# Patient Record
Sex: FEMALE | Race: WHITE | Employment: OTHER | URBAN - METROPOLITAN AREA
[De-identification: names, ages, dates, MRNs, and addresses within clinical notes are randomized per-mention and may not be internally consistent; named-entity substitution may affect disease eponyms.]

---

## 2017-01-06 ENCOUNTER — HOSPITAL ENCOUNTER (OUTPATIENT)
Dept: PHYSICAL THERAPY | Facility: CLINIC | Age: 77
Setting detail: THERAPIES SERIES
End: 2017-01-06
Attending: ORTHOPAEDIC SURGERY
Payer: MEDICARE

## 2017-01-06 PROCEDURE — 40000718 ZZHC STATISTIC PT DEPARTMENT ORTHO VISIT: Performed by: PHYSICAL THERAPIST

## 2017-01-06 PROCEDURE — 97110 THERAPEUTIC EXERCISES: CPT | Mod: GP | Performed by: PHYSICAL THERAPIST

## 2017-01-20 ENCOUNTER — ANESTHESIA EVENT (OUTPATIENT)
Dept: GASTROENTEROLOGY | Facility: CLINIC | Age: 77
End: 2017-01-20
Payer: MEDICARE

## 2017-01-20 NOTE — ANESTHESIA PREPROCEDURE EVALUATION
Anesthesia Evaluation     . Pt has had prior anesthetic. Type: General and MAC    No history of anesthetic complications     ROS/MED HX    ENT/Pulmonary:  - neg pulmonary ROS     Neurologic: Comment: Imbalance  CTS      Cardiovascular: Comment: Dependent edema    (+) Dyslipidemia, hypertension----. : . . . :. . Previous cardiac testing Echodate:[id number]:NLdate: results:ECG reviewed date:11-24-16 results:Sinus Rhythm   -Left atrial enlargement.     BORDERLINE date: results:          METS/Exercise Tolerance:  3 - Able to walk 1-2 blocks without stopping   Hematologic:     (+) Anemia, -      Musculoskeletal: Comment: Osteopenia  Osteoarthrosis  RC tear  Avascular necrosis        GI/Hepatic: Comment: Colon polyp       (-) liver disease   Renal/Genitourinary: Comment: Nocturia  Proteinuria  CKD stage 3    (+) chronic renal disease, type: CRI,       Endo: Comment: goiter    (+) type I DM, thyroid problem hypothyroidism, Obesity, .      Psychiatric:  - neg psychiatric ROS       Infectious Disease:  - neg infectious disease ROS       Malignancy:      - no malignancy   Other: Comment: Physical deconditioning  Heredity hemorrhagic telangiectasia              Physical Exam  Normal systems: cardiovascular, pulmonary and dental    Airway   Mallampati: II    Dental     Cardiovascular       Pulmonary                     Anesthesia Plan      History & Physical Review  History and physical reviewed and following examination; no interval change.    ASA Status:  3 .        Plan for MAC Reason for MAC:  Deep or markedly invasive procedure (G8)         Postoperative Care      Consents  Anesthetic plan, risks, benefits and alternatives discussed with:  Patient..                          .

## 2017-01-23 ENCOUNTER — SURGERY (OUTPATIENT)
Age: 77
End: 2017-01-23

## 2017-01-23 ENCOUNTER — ANESTHESIA (OUTPATIENT)
Dept: GASTROENTEROLOGY | Facility: CLINIC | Age: 77
End: 2017-01-23
Payer: MEDICARE

## 2017-01-23 PROCEDURE — 25000125 ZZHC RX 250: Performed by: NURSE ANESTHETIST, CERTIFIED REGISTERED

## 2017-01-23 RX ORDER — PROPOFOL 10 MG/ML
INJECTION, EMULSION INTRAVENOUS PRN
Status: DISCONTINUED | OUTPATIENT
Start: 2017-01-23 | End: 2017-01-23

## 2017-01-23 RX ORDER — PROPOFOL 10 MG/ML
INJECTION, EMULSION INTRAVENOUS CONTINUOUS PRN
Status: DISCONTINUED | OUTPATIENT
Start: 2017-01-23 | End: 2017-01-23

## 2017-01-23 RX ORDER — GLYCOPYRROLATE 0.2 MG/ML
INJECTION, SOLUTION INTRAMUSCULAR; INTRAVENOUS PRN
Status: DISCONTINUED | OUTPATIENT
Start: 2017-01-23 | End: 2017-01-23

## 2017-01-23 RX ADMIN — GLYCOPYRROLATE 0.2 MG: 0.2 INJECTION, SOLUTION INTRAMUSCULAR; INTRAVENOUS at 11:10

## 2017-01-23 RX ADMIN — PROPOFOL 20 MG: 10 INJECTION, EMULSION INTRAVENOUS at 11:09

## 2017-01-23 RX ADMIN — PROPOFOL 20 MG: 10 INJECTION, EMULSION INTRAVENOUS at 11:12

## 2017-01-23 RX ADMIN — PROPOFOL 20 MG: 10 INJECTION, EMULSION INTRAVENOUS at 11:10

## 2017-01-23 RX ADMIN — PROPOFOL 180 MCG/KG/MIN: 10 INJECTION, EMULSION INTRAVENOUS at 11:12

## 2017-01-23 RX ADMIN — PROPOFOL 20 MG: 10 INJECTION, EMULSION INTRAVENOUS at 11:00

## 2017-01-23 RX ADMIN — PROPOFOL 20 MG: 10 INJECTION, EMULSION INTRAVENOUS at 11:11

## 2017-01-23 NOTE — ANESTHESIA CARE TRANSFER NOTE
Patient: Ana Easley    COLONOSCOPY (N/A Rectum)  Additional InformationProcedure(s):  Colonoscopy - Wound Class: II-Clean Contaminated    Diagnosis: screening  Diagnosis Additional Information: No value filed.    Anesthesia Type:   MAC     Note:    Patient transferred to:Phase II        Vitals: (Last set prior to Anesthesia Care Transfer)              Electronically Signed By: Eric Temple CRNA, APRN CRNA  January 23, 2017  11:41 AM

## 2017-02-07 NOTE — ADDENDUM NOTE
Encounter addended by: Hoenk, Kris, PT on: 2/7/2017  4:02 PM<BR>     Documentation filed: Clinical Notes, Episodes, Inpatient Document Flowsheet

## 2017-02-07 NOTE — PROGRESS NOTES
PHYSICAL THERAPY DISCHARGE  02/7/17 1100   Signing Clinician's Name / Credentials   Signing clinician's name / credentials Kris Hoenk, PT   Session Number   Session Number 2MC, pt phoned 1/12/17 stating she did not need any further PT   Ortho Goal 1   Goal Description pt will be able to sleep on left side in 4wkj   Target Date 01/29/17   Ortho Goal 2   Goal Description pt will be able to do all regular household tasks for her self as normal for her in 4wk   Target Date 01/29/17   Subjective Report   Subjective Report little better, said her  says she is moving better   Objective Measures   Objective Measures Objective Measure 1   Objective Measure 1   Details AROM L close to =R, R quite limited also   Therapeutic Procedure/exercise   Minutes 17   Skilled Intervention ROM and strength ex progression to further improve function   Patient Response sx listed   Treatment Detail pulleys, AA flex seated x5 - painful, active IR/ER with towel roll under arm seated x10, scap sets 5sec x5, review stretches   Plan   Plan for next session see 1x more, probably DC  Medicare G-code:  Patient did not attend a final scheduled session prior to discharge.  Unable to determine discharge functional status.    Pt DC to home program at this time   Comments   Comments shorter session as ROM doing well   Total Session Time   Total Session Time 17   Kris Hoenk, PT #[id number]  [facility]  '

## 2019-10-03 ENCOUNTER — HOSPITAL ENCOUNTER (INPATIENT)
Facility: CLINIC | Age: 79
LOS: 5 days | Discharge: NURSING, REHAB OR LONG-TERM CARE | DRG: 871 | End: 2019-10-08
Attending: FAMILY MEDICINE | Admitting: INTERNAL MEDICINE
Payer: MEDICARE

## 2019-10-03 ENCOUNTER — APPOINTMENT (OUTPATIENT)
Dept: GENERAL RADIOLOGY | Facility: CLINIC | Age: 79
DRG: 871 | End: 2019-10-03
Attending: FAMILY MEDICINE
Payer: MEDICARE

## 2019-10-03 ENCOUNTER — APPOINTMENT (OUTPATIENT)
Dept: CT IMAGING | Facility: CLINIC | Age: 79
DRG: 871 | End: 2019-10-03
Attending: FAMILY MEDICINE
Payer: MEDICARE

## 2019-10-03 DIAGNOSIS — N39.0 URINARY TRACT INFECTION WITH HEMATURIA, SITE UNSPECIFIED: ICD-10-CM

## 2019-10-03 DIAGNOSIS — G93.41 SEPTIC ENCEPHALOPATHY: ICD-10-CM

## 2019-10-03 DIAGNOSIS — R31.9 URINARY TRACT INFECTION WITH HEMATURIA, SITE UNSPECIFIED: ICD-10-CM

## 2019-10-03 DIAGNOSIS — R31.9 HEMATURIA, UNSPECIFIED TYPE: ICD-10-CM

## 2019-10-03 DIAGNOSIS — I10 ESSENTIAL HYPERTENSION: Primary | Chronic | ICD-10-CM

## 2019-10-03 DIAGNOSIS — N30.00 ACUTE CYSTITIS WITHOUT HEMATURIA: ICD-10-CM

## 2019-10-03 DIAGNOSIS — N39.0 URINARY TRACT INFECTION WITHOUT HEMATURIA, SITE UNSPECIFIED: ICD-10-CM

## 2019-10-03 LAB
ALBUMIN SERPL-MCNC: 3.4 G/DL (ref 3.4–5)
ALBUMIN UR-MCNC: 100 MG/DL
ALP SERPL-CCNC: 104 U/L (ref 40–150)
ALT SERPL W P-5'-P-CCNC: 28 U/L (ref 0–50)
ANION GAP SERPL CALCULATED.3IONS-SCNC: 8 MMOL/L (ref 3–14)
APPEARANCE UR: ABNORMAL
AST SERPL W P-5'-P-CCNC: 39 U/L (ref 0–45)
BACTERIA #/AREA URNS HPF: ABNORMAL /HPF
BASOPHILS # BLD AUTO: 0 10E9/L (ref 0–0.2)
BASOPHILS NFR BLD AUTO: 0.3 %
BILIRUB SERPL-MCNC: 0.9 MG/DL (ref 0.2–1.3)
BILIRUB UR QL STRIP: NEGATIVE
BUN SERPL-MCNC: 19 MG/DL (ref 7–30)
CALCIUM SERPL-MCNC: 8.8 MG/DL (ref 8.5–10.1)
CHLORIDE SERPL-SCNC: 97 MMOL/L (ref 94–109)
CO2 SERPL-SCNC: 27 MMOL/L (ref 20–32)
COLOR UR AUTO: ABNORMAL
CREAT SERPL-MCNC: 0.69 MG/DL (ref 0.52–1.04)
DIFFERENTIAL METHOD BLD: NORMAL
EOSINOPHIL # BLD AUTO: 0 10E9/L (ref 0–0.7)
EOSINOPHIL NFR BLD AUTO: 0 %
ERYTHROCYTE [DISTWIDTH] IN BLOOD BY AUTOMATED COUNT: 13 % (ref 10–15)
GFR SERPL CREATININE-BSD FRML MDRD: 82 ML/MIN/{1.73_M2}
GLUCOSE BLDC GLUCOMTR-MCNC: 210 MG/DL (ref 70–99)
GLUCOSE BLDC GLUCOMTR-MCNC: 376 MG/DL (ref 70–99)
GLUCOSE BLDC GLUCOMTR-MCNC: 387 MG/DL (ref 70–99)
GLUCOSE SERPL-MCNC: 207 MG/DL (ref 70–99)
GLUCOSE UR STRIP-MCNC: >499 MG/DL
HBA1C MFR BLD: 7.7 % (ref 0–5.6)
HCT VFR BLD AUTO: 44.7 % (ref 35–47)
HGB BLD-MCNC: 15.1 G/DL (ref 11.7–15.7)
HGB UR QL STRIP: ABNORMAL
IMM GRANULOCYTES # BLD: 0 10E9/L (ref 0–0.4)
IMM GRANULOCYTES NFR BLD: 0.4 %
KETONES UR STRIP-MCNC: 20 MG/DL
LACTATE BLD-SCNC: 1.1 MMOL/L (ref 0.7–2)
LEUKOCYTE ESTERASE UR QL STRIP: ABNORMAL
LYMPHOCYTES # BLD AUTO: 0.9 10E9/L (ref 0.8–5.3)
LYMPHOCYTES NFR BLD AUTO: 9.3 %
MCH RBC QN AUTO: 31.9 PG (ref 26.5–33)
MCHC RBC AUTO-ENTMCNC: 33.8 G/DL (ref 31.5–36.5)
MCV RBC AUTO: 94 FL (ref 78–100)
MONOCYTES # BLD AUTO: 1 10E9/L (ref 0–1.3)
MONOCYTES NFR BLD AUTO: 10.6 %
NEUTROPHILS # BLD AUTO: 7.3 10E9/L (ref 1.6–8.3)
NEUTROPHILS NFR BLD AUTO: 79.4 %
NITRATE UR QL: NEGATIVE
NRBC # BLD AUTO: 0 10*3/UL
NRBC BLD AUTO-RTO: 0 /100
PH UR STRIP: 5 PH (ref 5–7)
PLATELET # BLD AUTO: 197 10E9/L (ref 150–450)
POTASSIUM SERPL-SCNC: 3.9 MMOL/L (ref 3.4–5.3)
PROT SERPL-MCNC: 7.3 G/DL (ref 6.8–8.8)
RBC # BLD AUTO: 4.74 10E12/L (ref 3.8–5.2)
RBC #/AREA URNS AUTO: 6 /HPF (ref 0–2)
SODIUM SERPL-SCNC: 132 MMOL/L (ref 133–144)
SOURCE: ABNORMAL
SP GR UR STRIP: 1.02 (ref 1–1.03)
UROBILINOGEN UR STRIP-MCNC: 4 MG/DL (ref 0–2)
WBC # BLD AUTO: 9.2 10E9/L (ref 4–11)
WBC #/AREA URNS AUTO: >182 /HPF (ref 0–5)
WBC CLUMPS #/AREA URNS HPF: PRESENT /HPF

## 2019-10-03 PROCEDURE — 99285 EMERGENCY DEPT VISIT HI MDM: CPT | Mod: 25 | Performed by: FAMILY MEDICINE

## 2019-10-03 PROCEDURE — 99223 1ST HOSP IP/OBS HIGH 75: CPT | Mod: AI | Performed by: PHYSICIAN ASSISTANT

## 2019-10-03 PROCEDURE — 25000128 H RX IP 250 OP 636: Performed by: PHYSICIAN ASSISTANT

## 2019-10-03 PROCEDURE — 96365 THER/PROPH/DIAG IV INF INIT: CPT | Performed by: FAMILY MEDICINE

## 2019-10-03 PROCEDURE — 87088 URINE BACTERIA CULTURE: CPT | Performed by: FAMILY MEDICINE

## 2019-10-03 PROCEDURE — 81001 URINALYSIS AUTO W/SCOPE: CPT | Performed by: FAMILY MEDICINE

## 2019-10-03 PROCEDURE — 83605 ASSAY OF LACTIC ACID: CPT | Performed by: FAMILY MEDICINE

## 2019-10-03 PROCEDURE — 85025 COMPLETE CBC W/AUTO DIFF WBC: CPT | Performed by: FAMILY MEDICINE

## 2019-10-03 PROCEDURE — 12000000 ZZH R&B MED SURG/OB

## 2019-10-03 PROCEDURE — 70450 CT HEAD/BRAIN W/O DYE: CPT

## 2019-10-03 PROCEDURE — 87086 URINE CULTURE/COLONY COUNT: CPT | Performed by: FAMILY MEDICINE

## 2019-10-03 PROCEDURE — 00000146 ZZHCL STATISTIC GLUCOSE BY METER IP

## 2019-10-03 PROCEDURE — 99207 ZZC CDG-CODE CATEGORY CHANGED: CPT | Performed by: PHYSICIAN ASSISTANT

## 2019-10-03 PROCEDURE — 83036 HEMOGLOBIN GLYCOSYLATED A1C: CPT | Performed by: PHYSICIAN ASSISTANT

## 2019-10-03 PROCEDURE — 96366 THER/PROPH/DIAG IV INF ADDON: CPT | Performed by: FAMILY MEDICINE

## 2019-10-03 PROCEDURE — 25800030 ZZH RX IP 258 OP 636: Performed by: PHYSICIAN ASSISTANT

## 2019-10-03 PROCEDURE — 80053 COMPREHEN METABOLIC PANEL: CPT | Performed by: FAMILY MEDICINE

## 2019-10-03 PROCEDURE — 87040 BLOOD CULTURE FOR BACTERIA: CPT | Performed by: FAMILY MEDICINE

## 2019-10-03 PROCEDURE — 71046 X-RAY EXAM CHEST 2 VIEWS: CPT

## 2019-10-03 PROCEDURE — 25000132 ZZH RX MED GY IP 250 OP 250 PS 637: Mod: GY | Performed by: PHYSICIAN ASSISTANT

## 2019-10-03 PROCEDURE — 99285 EMERGENCY DEPT VISIT HI MDM: CPT | Mod: Z6 | Performed by: FAMILY MEDICINE

## 2019-10-03 PROCEDURE — 25000128 H RX IP 250 OP 636: Performed by: FAMILY MEDICINE

## 2019-10-03 PROCEDURE — 87186 SC STD MICRODIL/AGAR DIL: CPT | Performed by: FAMILY MEDICINE

## 2019-10-03 PROCEDURE — 25000132 ZZH RX MED GY IP 250 OP 250 PS 637: Mod: GY | Performed by: FAMILY MEDICINE

## 2019-10-03 RX ORDER — LABETALOL 20 MG/4 ML (5 MG/ML) INTRAVENOUS SYRINGE
10
Status: DISCONTINUED | OUTPATIENT
Start: 2019-10-03 | End: 2019-10-08 | Stop reason: HOSPADM

## 2019-10-03 RX ORDER — ONDANSETRON 4 MG/1
4 TABLET, ORALLY DISINTEGRATING ORAL EVERY 6 HOURS PRN
Status: DISCONTINUED | OUTPATIENT
Start: 2019-10-03 | End: 2019-10-08 | Stop reason: HOSPADM

## 2019-10-03 RX ORDER — ACETAMINOPHEN 325 MG/1
650 TABLET ORAL EVERY 4 HOURS PRN
Status: DISCONTINUED | OUTPATIENT
Start: 2019-10-03 | End: 2019-10-08 | Stop reason: HOSPADM

## 2019-10-03 RX ORDER — POLYETHYLENE GLYCOL 3350 17 G/17G
17 POWDER, FOR SOLUTION ORAL DAILY PRN
Status: DISCONTINUED | OUTPATIENT
Start: 2019-10-03 | End: 2019-10-08 | Stop reason: HOSPADM

## 2019-10-03 RX ORDER — ONDANSETRON 2 MG/ML
4 INJECTION INTRAMUSCULAR; INTRAVENOUS EVERY 6 HOURS PRN
Status: DISCONTINUED | OUTPATIENT
Start: 2019-10-03 | End: 2019-10-08 | Stop reason: HOSPADM

## 2019-10-03 RX ORDER — NALOXONE HYDROCHLORIDE 0.4 MG/ML
.1-.4 INJECTION, SOLUTION INTRAMUSCULAR; INTRAVENOUS; SUBCUTANEOUS
Status: DISCONTINUED | OUTPATIENT
Start: 2019-10-03 | End: 2019-10-08 | Stop reason: HOSPADM

## 2019-10-03 RX ORDER — ATORVASTATIN CALCIUM 20 MG/1
40 TABLET, FILM COATED ORAL DAILY
Status: DISCONTINUED | OUTPATIENT
Start: 2019-10-04 | End: 2019-10-08 | Stop reason: HOSPADM

## 2019-10-03 RX ORDER — LIDOCAINE 40 MG/G
CREAM TOPICAL
Status: DISCONTINUED | OUTPATIENT
Start: 2019-10-03 | End: 2019-10-08 | Stop reason: HOSPADM

## 2019-10-03 RX ORDER — SODIUM CHLORIDE 9 MG/ML
INJECTION, SOLUTION INTRAVENOUS CONTINUOUS
Status: DISCONTINUED | OUTPATIENT
Start: 2019-10-03 | End: 2019-10-05

## 2019-10-03 RX ORDER — NICOTINE POLACRILEX 4 MG
15-30 LOZENGE BUCCAL
Status: DISCONTINUED | OUTPATIENT
Start: 2019-10-03 | End: 2019-10-06

## 2019-10-03 RX ORDER — LEVOTHYROXINE SODIUM 125 UG/1
125 TABLET ORAL
Status: DISCONTINUED | OUTPATIENT
Start: 2019-10-04 | End: 2019-10-08 | Stop reason: HOSPADM

## 2019-10-03 RX ORDER — CEFTRIAXONE SODIUM 1 G/50ML
1 INJECTION, SOLUTION INTRAVENOUS EVERY 24 HOURS
Status: DISCONTINUED | OUTPATIENT
Start: 2019-10-03 | End: 2019-10-08 | Stop reason: HOSPADM

## 2019-10-03 RX ORDER — AMOXICILLIN 250 MG
1 CAPSULE ORAL 2 TIMES DAILY PRN
Status: DISCONTINUED | OUTPATIENT
Start: 2019-10-03 | End: 2019-10-08 | Stop reason: HOSPADM

## 2019-10-03 RX ORDER — LISINOPRIL 2.5 MG/1
2.5 TABLET ORAL DAILY
Status: DISCONTINUED | OUTPATIENT
Start: 2019-10-04 | End: 2019-10-03

## 2019-10-03 RX ORDER — LISINOPRIL 2.5 MG/1
2.5 TABLET ORAL DAILY
Status: DISCONTINUED | OUTPATIENT
Start: 2019-10-03 | End: 2019-10-07

## 2019-10-03 RX ORDER — FERROUS SULFATE 325(65) MG
325 TABLET ORAL
Status: DISCONTINUED | OUTPATIENT
Start: 2019-10-04 | End: 2019-10-08 | Stop reason: HOSPADM

## 2019-10-03 RX ORDER — ACETAMINOPHEN 325 MG/1
650 TABLET ORAL ONCE
Status: COMPLETED | OUTPATIENT
Start: 2019-10-03 | End: 2019-10-03

## 2019-10-03 RX ORDER — AMOXICILLIN 250 MG
2 CAPSULE ORAL 2 TIMES DAILY PRN
Status: DISCONTINUED | OUTPATIENT
Start: 2019-10-03 | End: 2019-10-08 | Stop reason: HOSPADM

## 2019-10-03 RX ORDER — DEXTROSE MONOHYDRATE 25 G/50ML
25-50 INJECTION, SOLUTION INTRAVENOUS
Status: DISCONTINUED | OUTPATIENT
Start: 2019-10-03 | End: 2019-10-06

## 2019-10-03 RX ADMIN — CEFTRIAXONE SODIUM 1 G: 1 INJECTION, SOLUTION INTRAVENOUS at 13:44

## 2019-10-03 RX ADMIN — ENOXAPARIN SODIUM 40 MG: 40 INJECTION SUBCUTANEOUS at 20:50

## 2019-10-03 RX ADMIN — LISINOPRIL 2.5 MG: 2.5 TABLET ORAL at 20:49

## 2019-10-03 RX ADMIN — SODIUM CHLORIDE: 9 INJECTION, SOLUTION INTRAVENOUS at 20:48

## 2019-10-03 RX ADMIN — ACETAMINOPHEN 650 MG: 325 TABLET, FILM COATED ORAL at 18:43

## 2019-10-03 ASSESSMENT — ACTIVITIES OF DAILY LIVING (ADL): ADLS_ACUITY_SCORE: 19

## 2019-10-03 ASSESSMENT — MIFFLIN-ST. JEOR
SCORE: 1189.37
SCORE: 1190.5

## 2019-10-03 NOTE — ED NOTES
Came from triage with friend, Elizabeth  Pt lives alone at home  Elizabeth states she talks to pt every day, visited pt today and pt was undressed and confused, not answering questions appropriately, word-finding difficultly, chang  States she fell 2 days ago, landed on bottom, denies head injury  Denies recent illnesses  Denies dyspnea or CP  No abdominal pain. Denies n/v or diarrhea  No neck or back pain, no spinal tenderness  Pt was incontinent of stool upon arrival  Abrasion right lateral posterior ribs upon arrival

## 2019-10-03 NOTE — LETTER
Transition Communication Hand-off for Care Transitions to Next Level of Care Provider    Name: Ana Easley  : 1940  MRN #: [id number]  Primary Care Provider: Rosaura Pierre  Primary Care MD Name: (Gab[facility])  Primary Clinic: No address on file  Primary Care Clinic Name: ([facility])  Reason for Hospitalization:  Septic encephalopathy [G93.41]  Urinary tract infection with hematuria, site unspecified [N39.0, R31.9]  Admit Date/Time: 10/3/2019 12:58 PM  Discharge Date: 10/8  Payor Source: Payor: MEDICARE / Plan: MEDICARE / Product Type: Medicare /     Readmission Assessment Measure (RAUL) Risk Score/category: average           Reason for Communication Hand-off Referral: Fragility    Discharge Plan: now discharge plan to TCU       Concern for non-adherence with plan of care:   Y/N no  Discharge Needs Assessment:  Needs      Most Recent Value   Equipment Currently Used at Home  shower chair, dressing device            Follow-up specialty is recommended: Yes    Follow-up plan:  No future appointments.    Any outstanding tests or procedures:        Referrals     Future Labs/Procedures    Occupational Therapy Adult Consult     Comments:    Evaluate and treat as clinically indicated.    Reason:  Physical deconditioning    Physical Therapy Adult Consult     Comments:    Evaluate and treat as clinically indicated.    Reason:  Physical deconditioning            Key Recommendations:  Discharge plan changed, cognitive ability concerning for family as patient lives independently.  No family local.  Unable to understand discharge instructions.  Maia U    Erica Betancur RN    AVS/Discharge Summary is the source of truth; this is a helpful guide for improved communication of patient story

## 2019-10-03 NOTE — PROGRESS NOTES
[place] [facility]    Patient admitted to room 2308 at approximately 1858 via cart from emergency room. Patient was accompanied by transport tech.     Verbal SBAR report received from SARAH Gong prior to patient arrival.     Patient ambulated to bed with stand-by assist.  Admission vital signs: Blood pressure (!) 166/90, pulse 93, temperature 101.6  F (38.7  C), temperature source Oral, resp. rate 8, height 1.524 m (5'), weight 80.3 kg (177 lb), SpO2 90 %. Patient was oriented to plan of care, call light, bed controls, tv, telephone, bathroom and visiting hours.     Risk Assessment    The following safety risks were identified during admission: fall and skin. Yellow risk band applied: YES.          Abbie Geller RN
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
## 2019-10-03 NOTE — LETTER
Transition Communication Hand-off for Care Transitions to Next Level of Care Provider    Name: Ana Easley  : 1940  MRN #: [id number]  Primary Care Provider: Rosaura Pierre  Primary Care MD Name: (Gabby)  Primary Clinic: No address on file  [facility] Name: ([facility])  Reason for Hospitalization:  Septic encephalopathy [G93.41]  Urinary tract infection with hematuria, site unspecified [N39.0, R31.9]  Admit Date/Time: 10/3/2019 12:58 PM  Discharge Date: 10/8  Payor Source: Payor: MEDICARE / Plan: MEDICARE / Product Type: Medicare /     Readmission Assessment Measure (RAUL) Risk Score/category: average           Reason for Communication Hand-off Referral: Fragility    Discharge Plan: home w/ home care       Concern for non-adherence with plan of care:   Y/N yes  Discharge Needs Assessment:  Needs      Most Recent Value   Equipment Currently Used at Home  shower chair, dressing device          Follow-up specialty is recommended: Yes    Follow-up plan:    Future Appointments   Date Time Provider Department Center   10/8/2019  9:00 AM Tonja Cox OT [facility]       Any outstanding tests or procedures:        Referrals     Future Labs/Procedures    Home Care Referral     Comments:    Your provider has referred you to: [facility]: [facility] [phone]   [email or web address]    Extended Emergency Contact Information  Primary Emergency Contact: Lashaun Snider  Mobile Phone: [phone]  Relation: Relative  Secondary Emergency Contact: NUZHATJENNIFER   [facility]  Home Phone: [phone]  Relation: Sister    Patient Anticipated Discharge Date: 10/8   RN, PT, HHA to begin 24 - 48 hours after discharge.  PLEASE EVALUATE AND TREAT (Evaluation timeline is 24 - 48 hrs. Please call if there is need for a variance to this timeline).    REASON FOR REFERRAL: Assessment & Treatment: PT and RN    ADDITIONAL SERVICES NEEDED: HHA, OT and  SW    OTHER PERTINENT INFORMATION: Patient was last seen by provider on 10/7 for UTI.    No current outpatient medications on file.    Patient Active Problem List:     Carpal tunnel syndrome     Advance directive discussed with patient     Renal insufficiency     Dependent edema     Type 1 diabetes mellitus (H)     Osler hemorrhagic telangiectasia syndrome (H)     Benign colonic polyp     Hypothyroidism     Impairment of balance     Iron deficiency anemia     Non-toxic multinodular goiter     Osteoarthritis     Osteopenia     Hyperlipidemia     Proteinuria     Obesity with body mass index 30 or greater     Essential hypertension     UTI (urinary tract infection)     Other iron deficiency anemia     Disorder of tendon of shoulder region, left     AVN (avascular necrosis of bone) (H)     Partial tear of left rotator cuff      Documentation of Face to Face and Certification for Home Health Services    I certify that patient, Ana Easley is under my care and that I, or a Nurse Practitioner or Physician's Assistant working with me, had a face-to-face encounter that meets the physician face-to-face encounter requirements with this patient on: 10/7/2019.    This encounter with the patient was in whole, or in part, for the following medical condition, which is the primary reason for Home Health Care: UTI.    I certify that, based on my findings, the following services are medically necessary Home Health Services: Nursing, Occupational Therapy, Physical Therapy and Social Work    My clinical findings support the need for the above services because: Nurse is needed: To provide assessment and oversight required in the home to assure adherence to the medical plan due to: UTI.., Occupational Therapy Services are needed to assess and treat cognitive ability and address ADL safety due to impairment in UTI/safety eval. and Physical Therapy Services are needed to assess and treat the following functional impairments:  UTI.    Further, I certify that my clinical findings support that this patient is homebound (i.e. absences from home require considerable and taxing effort and are for medical reasons or [facility] or infrequently or of short duration when for other reasons) because: Requires assistance of another person or specialized equipment to access medical services because patient: Requires supervision of another for safe transfer..    Based on the above findings, I certify that this patient is confined to the home and needs intermittent skilled nursing care, physical therapy and/or speech therapy.  The patient is under my care, and I have initiated the establishment of the plan of care.  This patient will be followed by a physician who will periodically review the plan of care.    Physician/Provider to provide follow up care: Rosaura Pierre    [facility] certified Physician at time of discharge: Dr. Richter    Please be aware that coverage of these services is subject to the terms and limitations of your health insurance plan.  Call member services at your health plan with any benefit or coverage questions.            Key Recommendations:  Independent patient discharging home after UTI, still with cognitive defecit.  [facility] following up for safety eval. Niece, katelyn and friend Elizabeth most involved.  Limited family resources locally.    Erica Betancur RN    AVS/Discharge Summary is the source of truth; this is a helpful guide for improved communication of patient story

## 2019-10-04 ENCOUNTER — APPOINTMENT (OUTPATIENT)
Dept: PHYSICAL THERAPY | Facility: CLINIC | Age: 79
DRG: 871 | End: 2019-10-04
Payer: MEDICARE

## 2019-10-04 LAB
ANION GAP SERPL CALCULATED.3IONS-SCNC: 9 MMOL/L (ref 3–14)
BACTERIA SPEC CULT: ABNORMAL
BUN SERPL-MCNC: 20 MG/DL (ref 7–30)
CALCIUM SERPL-MCNC: 8 MG/DL (ref 8.5–10.1)
CHLORIDE SERPL-SCNC: 103 MMOL/L (ref 94–109)
CO2 SERPL-SCNC: 25 MMOL/L (ref 20–32)
CREAT SERPL-MCNC: 0.77 MG/DL (ref 0.52–1.04)
ERYTHROCYTE [DISTWIDTH] IN BLOOD BY AUTOMATED COUNT: 13.3 % (ref 10–15)
GFR SERPL CREATININE-BSD FRML MDRD: 72 ML/MIN/{1.73_M2}
GLUCOSE BLDC GLUCOMTR-MCNC: 204 MG/DL (ref 70–99)
GLUCOSE BLDC GLUCOMTR-MCNC: 227 MG/DL (ref 70–99)
GLUCOSE BLDC GLUCOMTR-MCNC: 253 MG/DL (ref 70–99)
GLUCOSE BLDC GLUCOMTR-MCNC: 269 MG/DL (ref 70–99)
GLUCOSE BLDC GLUCOMTR-MCNC: 269 MG/DL (ref 70–99)
GLUCOSE BLDC GLUCOMTR-MCNC: 271 MG/DL (ref 70–99)
GLUCOSE BLDC GLUCOMTR-MCNC: 302 MG/DL (ref 70–99)
GLUCOSE BLDC GLUCOMTR-MCNC: 316 MG/DL (ref 70–99)
GLUCOSE SERPL-MCNC: 218 MG/DL (ref 70–99)
HCT VFR BLD AUTO: 37.5 % (ref 35–47)
HGB BLD-MCNC: 12.2 G/DL (ref 11.7–15.7)
Lab: ABNORMAL
MCH RBC QN AUTO: 31 PG (ref 26.5–33)
MCHC RBC AUTO-ENTMCNC: 32.5 G/DL (ref 31.5–36.5)
MCV RBC AUTO: 95 FL (ref 78–100)
PLATELET # BLD AUTO: 175 10E9/L (ref 150–450)
POTASSIUM SERPL-SCNC: 3.5 MMOL/L (ref 3.4–5.3)
RBC # BLD AUTO: 3.93 10E12/L (ref 3.8–5.2)
SODIUM SERPL-SCNC: 137 MMOL/L (ref 133–144)
SPECIMEN SOURCE: ABNORMAL
WBC # BLD AUTO: 7 10E9/L (ref 4–11)

## 2019-10-04 PROCEDURE — 85027 COMPLETE CBC AUTOMATED: CPT | Performed by: PHYSICIAN ASSISTANT

## 2019-10-04 PROCEDURE — 25800030 ZZH RX IP 258 OP 636: Performed by: FAMILY MEDICINE

## 2019-10-04 PROCEDURE — 25000132 ZZH RX MED GY IP 250 OP 250 PS 637: Mod: GY | Performed by: PHYSICIAN ASSISTANT

## 2019-10-04 PROCEDURE — 97530 THERAPEUTIC ACTIVITIES: CPT | Mod: GP

## 2019-10-04 PROCEDURE — 97116 GAIT TRAINING THERAPY: CPT | Mod: GP

## 2019-10-04 PROCEDURE — 00000146 ZZHCL STATISTIC GLUCOSE BY METER IP

## 2019-10-04 PROCEDURE — 99233 SBSQ HOSP IP/OBS HIGH 50: CPT | Performed by: FAMILY MEDICINE

## 2019-10-04 PROCEDURE — 25000132 ZZH RX MED GY IP 250 OP 250 PS 637: Mod: GY | Performed by: FAMILY MEDICINE

## 2019-10-04 PROCEDURE — 25000128 H RX IP 250 OP 636: Performed by: PHYSICIAN ASSISTANT

## 2019-10-04 PROCEDURE — 80048 BASIC METABOLIC PNL TOTAL CA: CPT | Performed by: PHYSICIAN ASSISTANT

## 2019-10-04 PROCEDURE — 25000128 H RX IP 250 OP 636: Performed by: FAMILY MEDICINE

## 2019-10-04 PROCEDURE — 12000000 ZZH R&B MED SURG/OB

## 2019-10-04 PROCEDURE — 25800030 ZZH RX IP 258 OP 636: Performed by: PHYSICIAN ASSISTANT

## 2019-10-04 PROCEDURE — 97161 PT EVAL LOW COMPLEX 20 MIN: CPT | Mod: GP

## 2019-10-04 PROCEDURE — 25000131 ZZH RX MED GY IP 250 OP 636 PS 637: Mod: GY | Performed by: PHYSICIAN ASSISTANT

## 2019-10-04 PROCEDURE — 36415 COLL VENOUS BLD VENIPUNCTURE: CPT | Performed by: PHYSICIAN ASSISTANT

## 2019-10-04 RX ORDER — AZITHROMYCIN 250 MG/1
250 TABLET, FILM COATED ORAL DAILY
Status: COMPLETED | OUTPATIENT
Start: 2019-10-05 | End: 2019-10-08

## 2019-10-04 RX ORDER — AZITHROMYCIN 250 MG/1
500 TABLET, FILM COATED ORAL ONCE
Status: COMPLETED | OUTPATIENT
Start: 2019-10-04 | End: 2019-10-04

## 2019-10-04 RX ADMIN — CEFTRIAXONE SODIUM 1 G: 1 INJECTION, SOLUTION INTRAVENOUS at 13:39

## 2019-10-04 RX ADMIN — AZITHROMYCIN 500 MG: 250 TABLET, FILM COATED ORAL at 12:31

## 2019-10-04 RX ADMIN — LEVOTHYROXINE SODIUM 125 MCG: 125 TABLET ORAL at 06:45

## 2019-10-04 RX ADMIN — SODIUM CHLORIDE: 9 INJECTION, SOLUTION INTRAVENOUS at 13:39

## 2019-10-04 RX ADMIN — ENOXAPARIN SODIUM 40 MG: 40 INJECTION SUBCUTANEOUS at 20:33

## 2019-10-04 RX ADMIN — INSULIN ASPART 1 UNITS: 100 INJECTION, SOLUTION INTRAVENOUS; SUBCUTANEOUS at 08:54

## 2019-10-04 RX ADMIN — INSULIN ASPART 2 UNITS: 100 INJECTION, SOLUTION INTRAVENOUS; SUBCUTANEOUS at 12:32

## 2019-10-04 RX ADMIN — FERROUS SULFATE TAB 325 MG (65 MG ELEMENTAL FE) 325 MG: 325 (65 FE) TAB at 12:31

## 2019-10-04 RX ADMIN — INSULIN ASPART 2 UNITS: 100 INJECTION, SOLUTION INTRAVENOUS; SUBCUTANEOUS at 17:45

## 2019-10-04 RX ADMIN — ACETAMINOPHEN 650 MG: 325 TABLET, FILM COATED ORAL at 04:11

## 2019-10-04 RX ADMIN — INSULIN DEGLUDEC INJECTION 10 UNITS: 100 INJECTION, SOLUTION SUBCUTANEOUS at 08:46

## 2019-10-04 RX ADMIN — ATORVASTATIN CALCIUM 40 MG: 20 TABLET, FILM COATED ORAL at 08:45

## 2019-10-04 RX ADMIN — ACETAMINOPHEN 650 MG: 325 TABLET, FILM COATED ORAL at 20:32

## 2019-10-04 RX ADMIN — SODIUM CHLORIDE: 9 INJECTION, SOLUTION INTRAVENOUS at 03:30

## 2019-10-04 RX ADMIN — SODIUM CHLORIDE 500 ML: 9 INJECTION, SOLUTION INTRAVENOUS at 00:12

## 2019-10-04 RX ADMIN — LISINOPRIL 2.5 MG: 2.5 TABLET ORAL at 08:45

## 2019-10-04 ASSESSMENT — ACTIVITIES OF DAILY LIVING (ADL)
BATHING: 0-->INDEPENDENT
TOILETING: 0-->INDEPENDENT
DRESS: 0-->INDEPENDENT
NUMBER_OF_TIMES_PATIENT_HAS_FALLEN_WITHIN_LAST_SIX_MONTHS: 1
ADLS_ACUITY_SCORE: 17
SWALLOWING: 0-->SWALLOWS FOODS/LIQUIDS WITHOUT DIFFICULTY
ADLS_ACUITY_SCORE: 17
RETIRED_COMMUNICATION: 0-->UNDERSTANDS/COMMUNICATES WITHOUT DIFFICULTY
AMBULATION: 1-->ASSISTIVE EQUIPMENT
COGNITION: 1 - ATTENTION OR MEMORY DEFICITS
WHICH_OF_THE_ABOVE_FUNCTIONAL_RISKS_HAD_A_RECENT_ONSET_OR_CHANGE?: AMBULATION;FALL HISTORY
ADLS_ACUITY_SCORE: 17
RETIRED_EATING: 0-->INDEPENDENT
TRANSFERRING: 1-->ASSISTIVE EQUIPMENT
FALL_HISTORY_WITHIN_LAST_SIX_MONTHS: YES
ADLS_ACUITY_SCORE: 17

## 2019-10-04 NOTE — PROGRESS NOTES
T102.9, recurrent fever so medicating w/ prn tylenol. BG continuing to decrease slowly. Is down to 227 w/ latest check @ 0400, given another 2 units of sliding scale insulin. Pt had previously received 2 U for BG of 387 @ 2200, then another 5 U @ 2327 per provider request. BG @ 0130 was 253 & per discussion w/ B. Ratna DENNY q 4 hr sliding scale coverage was ordered.
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
## 2019-10-04 NOTE — PROGRESS NOTES
Skin affirmation note    Admitting nurse completed full skin assessment, Saad score and Saad interventions. This writer agrees with the initial skin assessment findings.

## 2019-10-04 NOTE — PROGRESS NOTES
CARE TRANSITION SOCIAL WORK INITIAL ASSESSMENT:      Met with: Patient and Family.    DATA  Principal Problem:    UTI (urinary tract infection)  Active Problems:    Type 1 diabetes mellitus (H)    Hypothyroidism    Iron deficiency anemia    Non-toxic multinodular goiter    Osteoarthritis    Hyperlipidemia    Essential hypertension       [facility]: ([facility])  Primary Care MD Name: (Gabby)  Contact information and PCP information verified: Yes      ASSESSMENT  Cognitive Status: confused.       Resources List: Transitional Care, Skilled Nursing Facility, Home Care              Description of Support System: Supportive, Involved   Who is your support system?: Children   Support Assessment: Adequate family and caregiver support, Adequate social supports   Insurance Concerns: No Insurance issues identified           This writer met with pt introduced self and role. However; pt is confused, unable to answer questions, thought this writer was a relative.     This writer did contact family as pt has a HCD on file. Discussed discharge planning and medicare guidelines in regards to home care and SNF benefits. Talked with pts sister, Onelia. Onelia indicated that LashaunAnnieOnelia's dtr, is pts primary Health care agent, she will update Lashaun and provide her with [facility] phone numbers. Pt does not have any family in the area, sister and niece also do not live close.     Sister indicated that pt lives at home alone and does not have any current services. Discussed home care and TCU, sister agreed to either options at this point, but hopefully pt will clear and will be able to make her own decisions. In the meantime her sister did agree for TCU referrals to be sent in the event that pt will need TCU. Onelia was provided with Medicare certified nursing home list. Sister's choices are as follows [facility] (Phone: [phone] Fax: [phone]) and [facility] Phone (Main  Phone:[phone] Admissions Phone:[phone] Fax: [phone]).  Pt will most likely be here through the weekend. TCU referrals sent.     Sister indicated that pts goal will most likely to return home.          PLAN    Home care vs TCU-pt confused at this time        Kita COOPER, [facility], [phone]

## 2019-10-04 NOTE — PROGRESS NOTES
[facility]      Subjective:  Feels better  No difficulty breathing  Maybe some mild cough  No uti symptoms    Review of Systems:  CONSTITUTIONAL:overall feels better  INTEGUMENTARY/SKIN: NEGATIVE for worrisome rashes, moles or lesions  EYES: NEGATIVE for vision changes or irritation  ENT/MOUTH: NEGATIVE for ear, mouth and throat problems  RESP: NEGATIVE for significant cough or SOB  BREAST: NEGATIVE for masses, tenderness or discharge  CV: NEGATIVE for chest pain, palpitations or peripheral edema  GI: NEGATIVE for nausea, abdominal pain, heartburn, or change in bowel habits  : NEGATIVE for frequency, dysuria, or hematuria  MUSCULOSKELETAL: NEGATIVE for significant arthralgias or myalgia  NEURO: NEGATIVE for weakness, dizziness or paresthesias  ENDOCRINE: NEGATIVE for temperature intolerance, skin/hair changes  HEME: NEGATIVE for bleeding problems  PSYCHIATRIC: NEGATIVE for changes in mood or affect    Physical Exam:  Vitals Were Reviewed    Patient Vitals for the past 16 hrs:   BP Temp Temp src Pulse Resp SpO2   10/04/19 0746 (!) 141/67 98.6  F (37  C) Oral 70 18 95 %   10/04/19 0530 -- 99.4  F (37.4  C) Oral -- -- --   10/04/19 0456 -- -- -- -- 20 --   10/04/19 0407 (!) 171/69 102.9  F (39.4  C) Oral 80 20 94 %   10/03/19 2310 (!) 149/64 98.7  F (37.1  C) Oral 79 20 95 %   10/03/19 1928 -- -- -- -- 18 --         Intake/Output Summary (Last 24 hours) at 10/4/2019 1104  Last data filed at 10/4/2019 0900  Gross per 24 hour   Intake --   Output 200 ml   Net -200 ml       GENERAL APPEARANCE: healthy, alert and no distress  EYES: conjunctiva clear, eyes grossly normal  RESP: some crackles left base  CV: regular rate and rhythm, normal S1 S2, no S3 or S4 and no murmur, click or rub   ABDOMEN: soft, nontender, no HSM or masses and bowel sounds normal  MS: no clubbing, cyanosis; no edema  SKIN: clear without significant rashes or lesions    Lab:  Recent Labs   Lab Test 10/04/19  0531 10/03/19  1311     132*   POTASSIUM 3.5 3.9   CHLORIDE 103 97   CO2 25 27   ANIONGAP 9 8   * 207*   BUN 20 19   CR 0.77 0.69   GRANT 8.0* 8.8     CBC RESULTS:   Recent Labs   Lab Test 10/04/19  0531 10/03/19  1311   WBC 7.0 9.2   RBC 3.93 4.74   HGB 12.2 15.1   HCT 37.5 44.7    197       Results for orders placed or performed during the hospital encounter of 10/03/19 (from the past 24 hour(s))   Glucose by meter   Result Value Ref Range    Glucose 210 (H) 70 - 99 mg/dL   CBC with platelets, differential   Result Value Ref Range    WBC 9.2 4.0 - 11.0 10e9/L    RBC Count 4.74 3.8 - 5.2 10e12/L    Hemoglobin 15.1 11.7 - 15.7 g/dL    Hematocrit 44.7 35.0 - 47.0 %    MCV 94 78 - 100 fl    MCH 31.9 26.5 - 33.0 pg    MCHC 33.8 31.5 - 36.5 g/dL    RDW 13.0 10.0 - 15.0 %    Platelet Count 197 150 - 450 10e9/L    Diff Method Automated Method     % Neutrophils 79.4 %    % Lymphocytes 9.3 %    % Monocytes 10.6 %    % Eosinophils 0.0 %    % Basophils 0.3 %    % Immature Granulocytes 0.4 %    Nucleated RBCs 0 0 /100    Absolute Neutrophil 7.3 1.6 - 8.3 10e9/L    Absolute Lymphocytes 0.9 0.8 - 5.3 10e9/L    Absolute Monocytes 1.0 0.0 - 1.3 10e9/L    Absolute Eosinophils 0.0 0.0 - 0.7 10e9/L    Absolute Basophils 0.0 0.0 - 0.2 10e9/L    Abs Immature Granulocytes 0.0 0 - 0.4 10e9/L    Absolute Nucleated RBC 0.0    Comprehensive metabolic panel   Result Value Ref Range    Sodium 132 (L) 133 - 144 mmol/L    Potassium 3.9 3.4 - 5.3 mmol/L    Chloride 97 94 - 109 mmol/L    Carbon Dioxide 27 20 - 32 mmol/L    Anion Gap 8 3 - 14 mmol/L    Glucose 207 (H) 70 - 99 mg/dL    Urea Nitrogen 19 7 - 30 mg/dL    Creatinine 0.69 0.52 - 1.04 mg/dL    GFR Estimate 82 >60 mL/min/[1.73_m2]    GFR Estimate If Black >90 >60 mL/min/[1.73_m2]    Calcium 8.8 8.5 - 10.1 mg/dL    Bilirubin Total 0.9 0.2 - 1.3 mg/dL    Albumin 3.4 3.4 - 5.0 g/dL    Protein Total 7.3 6.8 - 8.8 g/dL    Alkaline Phosphatase 104 40 - 150 U/L    ALT 28 0 - 50 U/L    AST 39 0 - 45  U/L   Hemoglobin A1c   Result Value Ref Range    Hemoglobin A1C 7.7 (H) 0 - 5.6 %   Routine UA with microscopic   Result Value Ref Range    Color Urine Priscilla     Appearance Urine Cloudy     Glucose Urine >499 (A) NEG^Negative mg/dL    Bilirubin Urine Negative NEG^Negative    Ketones Urine 20 (A) NEG^Negative mg/dL    Specific Gravity Urine 1.021 1.003 - 1.035    Blood Urine Moderate (A) NEG^Negative    pH Urine 5.0 5.0 - 7.0 pH    Protein Albumin Urine 100 (A) NEG^Negative mg/dL    Urobilinogen mg/dL 4.0 (H) 0.0 - 2.0 mg/dL    Nitrite Urine Negative NEG^Negative    Leukocyte Esterase Urine Large (A) NEG^Negative    Source Catheterized Urine     WBC Urine >182 (H) 0 - 5 /HPF    RBC Urine 6 (H) 0 - 2 /HPF    WBC Clumps Present (A) NEG^Negative /HPF    Bacteria Urine Few (A) NEG^Negative /HPF   Lactic acid whole blood   Result Value Ref Range    Lactic Acid 1.1 0.7 - 2.0 mmol/L   Urine Culture   Result Value Ref Range    Specimen Description Catheterized Urine     Special Requests Specimen received in preservative     Culture Micro (A)      >100,000 colonies/mL  Escherichia coli  Susceptibility testing in progress     Blood culture   Result Value Ref Range    Specimen Description Blood Right Arm     Special Requests Aerobic and anaerobic bottles received     Culture Micro No growth after 14 hours    Blood culture   Result Value Ref Range    Specimen Description Blood Right Hand     Special Requests Received in aerobic bottle only     Culture Micro No growth after 14 hours    XR Chest 2 Views    Narrative    CHEST TWO VIEWS  10/3/2019 2:26 PM     HISTORY: Fever.    COMPARISON: None.      Impression    IMPRESSION: Mild infiltrate at the left lung base medially is  suspicious for pneumonia. The right lung is clear. No pleural  effusions. There is mild pulmonary venous congestion.    CRUZ FUNES MD   CT Head w/o Contrast    Narrative    CT SCAN OF THE HEAD WITHOUT CONTRAST   10/3/2019 2:45 PM     HISTORY: Fall;  confusion.    TECHNIQUE: Axial images of the head and coronal reformations without  IV contrast material. Radiation dose for this scan was reduced using  automated exposure control, adjustment of the mA and/or kV according  to patient size, or iterative reconstruction technique.    COMPARISON: 5/10/2013    FINDINGS: There is no evidence of intracranial hemorrhage, mass, acute  infarct or anomaly. There is moderate generalized brain parenchymal  volume loss. There is moderate patchy low attenuation in the white  matter of the cerebral hemispheres consistent with sequelae of small  vessel ischemic disease. Mild prominence of the lateral ventricles  likely related to central white matter volume loss.    Mild scattered mucosal thickening in the ethmoid air cells. Mastoid  and middle ear cavities are clear. The bony calvarium and bones of the  skull base appear intact.       Impression    IMPRESSION: No evidence of acute intracranial hemorrhage, mass, or  herniation.      ANDRADE ZAVALA MD   Glucose by meter   Result Value Ref Range    Glucose 387 (H) 70 - 99 mg/dL   Glucose by meter   Result Value Ref Range    Glucose 376 (H) 70 - 99 mg/dL   Glucose by meter   Result Value Ref Range    Glucose 316 (H) 70 - 99 mg/dL   Glucose by meter   Result Value Ref Range    Glucose 269 (H) 70 - 99 mg/dL   Glucose by meter   Result Value Ref Range    Glucose 253 (H) 70 - 99 mg/dL   Glucose by meter   Result Value Ref Range    Glucose 227 (H) 70 - 99 mg/dL   Basic metabolic panel   Result Value Ref Range    Sodium 137 133 - 144 mmol/L    Potassium 3.5 3.4 - 5.3 mmol/L    Chloride 103 94 - 109 mmol/L    Carbon Dioxide 25 20 - 32 mmol/L    Anion Gap 9 3 - 14 mmol/L    Glucose 218 (H) 70 - 99 mg/dL    Urea Nitrogen 20 7 - 30 mg/dL    Creatinine 0.77 0.52 - 1.04 mg/dL    GFR Estimate 72 >60 mL/min/[1.73_m2]    GFR Estimate If Black 83 >60 mL/min/[1.73_m2]    Calcium 8.0 (L) 8.5 - 10.1 mg/dL   CBC with platelets   Result Value Ref Range     "WBC 7.0 4.0 - 11.0 10e9/L    RBC Count 3.93 3.8 - 5.2 10e12/L    Hemoglobin 12.2 11.7 - 15.7 g/dL    Hematocrit 37.5 35.0 - 47.0 %    MCV 95 78 - 100 fl    MCH 31.0 26.5 - 33.0 pg    MCHC 32.5 31.5 - 36.5 g/dL    RDW 13.3 10.0 - 15.0 %    Platelet Count 175 150 - 450 10e9/L   Glucose by meter   Result Value Ref Range    Glucose 204 (H) 70 - 99 mg/dL       Assessment and Plan:    Ana Easley is a 79 year old female admitted on 10/3/2019. She has history of type 1 diabetes, hypertension, hyperlipidemia, hypothyroidism and iron deficiency. She presents with confusion.     Urinary Tract Infection-ecoli and sepsis (mental status and fever)  UA shows blood, leukocyte esterase, > 182 WBC, and bacteria. Vitals show temp of 101.6 otherwise stable. Labs unremarkable aside from UA. Suspect UTI causing febrile illness and confusion. Prior urine culture in 2016 grew e.coli, no resistance listed on report. Started on IV ceftriaxone in ED.  Antibiotic: IV ceftriaxone (started 10/3/19)  Blood cultures-ngtd     Acute Metabolic Encephalopathy due to UTI  Confused at home, found by neighbor disheveled. On admission appears to be mildly forgetful at times and states she feels a little foggy in her thinking. Oriented to self, location, situation but not time \"I was wrong before and I'm not sure now so I'm not going to say\", states it is fall and the year is \"20\". UA positive. CXR with possible left lower lobe consolidation though more chronic-appearing, no prior comparison available. CT head negative. Neurological exam non-focal. No concerning medications or other concerning metabolic derrangements. Suspect related to current Illness.    Currently it seems like she has improved from the admit exam.    cxr with possible left base infiltrate  Doubt pn, but will add zithromax for now.     Hypertension  Chronic. Blood pressures reviewed, elevated in ED. Suspect in part related to stress/transfer to floor.  - continue home " lisinopril  - prn labetalol ordered     Diabetes Mellitus, Type I  Chronic.  Last a1C 8.0% on 9/2019 per [facility] labs. Managed at home with 10 units of Tresbia in the morning and 1 unit Novolog per 20g carbs with meals. Difficulty recognizing hypoglycemia so goal has been to avoid hypoglycemic episodes rather than tight control.   - continue PTA prandial insulin and long acting insulin  -medium sliding scale insulin (changed to this after having hyperglycemia).  - hypo/hyperglycemia protocol with blood glucose monitoring     Currently glucose 220 by glucometer this AM     Hyperlipidemia  Chronic.  - continue home atorvastatin     Hypothyroidism  Chronic.   - continue home levothyroxine     Iron Deficiency Anemia  Chronic. Hemoglobin 15.1 on admit. Stable.  - continue home iron     Diet: Consistent Carbohydrate Diet [id number] Calories: Moderate Consistent CHO (4-6 CHO units/meal)    DVT Prophylaxis: Enoxaparin (Lovenox) SQ  Fournier Catheter: not present  Code Status: Full code, presumed/prior, not discussed on admit due to confusion     Plan-continue rocephin, fluids to 50, continue current insulin regimen. Add oral zithromax. Follow cultures.

## 2019-10-04 NOTE — PROGRESS NOTES
10/04/19 1300   Quick Adds   Type of Visit Initial PT Evaluation   Living Environment   Lives With alone   Living Arrangements house   Home Accessibility no concerns   Living Environment Comment PT: Patient lives alone in a house with a ramp to enter.   Functional Level Prior   Ambulation 1-->assistive equipment   Transferring 1-->assistive equipment   Toileting 0-->independent   Bathing 0-->independent   Communication 0-->understands/communicates without difficulty   Swallowing 0-->swallows foods/liquids without difficulty   Cognition 1 - attention or memory deficits   Fall history within last six months yes   Number of times patient has fallen within last six months 1   Which of the above functional risks had a recent onset or change? ambulation;transferring;fall history   Prior Functional Level Comment PT: Patient primarily using 4WW for mobility at home. Prior to admission. Patient indicating IND with all ADLs.   General Information   Onset of Illness/Injury or Date of Surgery - Date 10/03/19   Referring Physician Anton Valdez MD   Pertinent History of Current Problem (include personal factors and/or comorbidities that impact the POC) Ana Easley is a 79 year old female admitted on 10/3/2019. She has history of type 1 diabetes, hypertension, hyperlipidemia, hypothyroidism and iron deficiency. She presents with confusion.   General Info Comments Activity: Up with assist   Cognitive Status Examination   Orientation person   Level of Consciousness confused   Follows Commands and Answers Questions 75% of the time;able to follow single-step instructions   Personal Safety and Judgment impaired   Memory impaired   Strength   Strength Comments PT: Decrease strength demonstrated by difficulty with transfers and bed mobiltiy   Bed Mobility   Bed Mobility Comments PT: ModA for sit<>supine, especailly getting LEs into bed. Supine<>sit from flat bed, CGA.    Transfer Skills   Transfer Comments PT: CGA-light  "Carlota for sit<>stand with FWW, harder if from low chair.   Gait   Gait Comments PT: Patient ambulated ~125' with FWW, CGA and frequent VCs for posture 2/2 FWW gets way too far in front of her.   Balance   Balance Comments PT: Slightly unstable with standing balance.   Sensory Examination   Sensory Perception Comments PT: PMH of DM 1, possible neuropathy.   General Therapy Interventions   Planned Therapy Interventions balance training;bed mobility training;gait training;joint mobilization;manual therapy;neuromuscular re-education;ROM;strengthening;stretching;transfer training;risk factor education;home program guidelines;progressive activity/exercise   Clinical Impression   Criteria for Skilled Therapeutic Intervention yes, treatment indicated   PT Diagnosis Impaired functional mobility   Influenced by the following impairments Safety concerns, confusion, decreased activity tolerance, impaired balance, weakness   Functional limitations due to impairments Gait, transfers, bed mobiltiy,community mobility, ADLs   Clinical Presentation Evolving/Changing   Clinical Presentation Rationale AMS, clinical judgment   Clinical Decision Making (Complexity) Low complexity   Therapy Frequency Daily   Predicted Duration of Therapy Intervention (days/wks) 5 days   Anticipated Discharge Disposition Transitional Care Facility   Risk & Benefits of therapy have been explained Yes   Patient, Family & other staff in agreement with plan of care Yes   Clinical Impression Comments PT evaluation complete, treatment initated.   [facility] AM-PAC TM \"6 Clicks\"   2016, Trustees of [facility], under license to [employer].  All rights reserved.   6 Clicks Short Forms Basic Mobility Inpatient Short Form   [facility] AM-PAC  \"6 Clicks\" V.2 Basic Mobility Inpatient Short Form   1. Turning from your back to your side while in a flat bed without using bedrails? 3 - A Little   2. Moving from lying on your back to sitting on the " side of a flat bed without using bedrails? 3 - A Little   3. Moving to and from a bed to a chair (including a wheelchair)? 3 - A Little   4. Standing up from a chair using your arms (e.g., wheelchair, or bedside chair)? 3 - A Little   5. To walk in hospital room? 3 - A Little   6. Climbing 3-5 steps with a railing? 2 - A Lot   Basic Mobility Raw Score (Score out of 24.Lower scores equate to lower levels of function) 17   Total Evaluation Time   Total Evaluation Time (Minutes) 5     Katherine Coronel PT, DTP  [employer]  [email or web address]  Pager: [phone]

## 2019-10-04 NOTE — PLAN OF CARE
Discharge Planner PT   Patient plan for discharge: Did not discuss  Current status: PT evaluation complete,treatment imitated. Patient Ox1. Patient sit<>stand with CGA-Jesse if low chair with FWW. Patient ambulated ~125' with FWW and CGA, needing frequent VCs for proximity to FWW to maintain safety. Patient modAto get into bed. Jesse for repositioning, CGA-light Jesse for getting OOB.     At this time, PT recommending discharge to TCU 2/2 patient lives alone and presents as a safety concern 2/2 recent fall, weakness and difficulty with bed mobility,transfers and limited activity tolerance.  Barriers to return to prior living situation: Medical, confusion, level of assist, safety concerns, decreased activity tolerance and weakness.  Recommendations for discharge: TCU  Rationale for recommendations: To progress strength, balance, safety and maximize functional independence/safety with mobility and ADLs.       Entered by: Katherine Coronel 10/04/2019 1:59 PM

## 2019-10-04 NOTE — PROGRESS NOTES
[facility]      Subjective:  Feels better  No difficulty breathing  Maybe some mild cough  No uti symptoms    Review of Systems:  CONSTITUTIONAL:overall feels better  INTEGUMENTARY/SKIN: NEGATIVE for worrisome rashes, moles or lesions  EYES: NEGATIVE for vision changes or irritation  ENT/MOUTH: NEGATIVE for ear, mouth and throat problems  RESP: NEGATIVE for significant cough or SOB  BREAST: NEGATIVE for masses, tenderness or discharge  CV: NEGATIVE for chest pain, palpitations or peripheral edema  GI: NEGATIVE for nausea, abdominal pain, heartburn, or change in bowel habits  : NEGATIVE for frequency, dysuria, or hematuria  MUSCULOSKELETAL: NEGATIVE for significant arthralgias or myalgia  NEURO: NEGATIVE for weakness, dizziness or paresthesias  ENDOCRINE: NEGATIVE for temperature intolerance, skin/hair changes  HEME: NEGATIVE for bleeding problems  PSYCHIATRIC: NEGATIVE for changes in mood or affect    Physical Exam:  Vitals Were Reviewed    Patient Vitals for the past 16 hrs:   BP Temp Temp src Pulse Resp SpO2   10/04/19 0746 (!) 141/67 98.6  F (37  C) Oral 70 18 95 %   10/04/19 0530 -- 99.4  F (37.4  C) Oral -- -- --   10/04/19 0456 -- -- -- -- 20 --   10/04/19 0407 (!) 171/69 102.9  F (39.4  C) Oral 80 20 94 %   10/03/19 2310 (!) 149/64 98.7  F (37.1  C) Oral 79 20 95 %   10/03/19 1928 -- -- -- -- 18 --         Intake/Output Summary (Last 24 hours) at 10/4/2019 1104  Last data filed at 10/4/2019 0900  Gross per 24 hour   Intake --   Output 200 ml   Net -200 ml       GENERAL APPEARANCE: healthy, alert and no distress  EYES: conjunctiva clear, eyes grossly normal  RESP: some crackles left base  CV: regular rate and rhythm, normal S1 S2, no S3 or S4 and no murmur, click or rub   ABDOMEN: soft, nontender, no HSM or masses and bowel sounds normal  MS: no clubbing, cyanosis; no edema  SKIN: clear without significant rashes or lesions    Lab:  Recent Labs   Lab Test 10/04/19  0531 10/03/19  1311     132*   POTASSIUM 3.5 3.9   CHLORIDE 103 97   CO2 25 27   ANIONGAP 9 8   * 207*   BUN 20 19   CR 0.77 0.69   GRANT 8.0* 8.8     CBC RESULTS:   Recent Labs   Lab Test 10/04/19  0531 10/03/19  1311   WBC 7.0 9.2   RBC 3.93 4.74   HGB 12.2 15.1   HCT 37.5 44.7    197       Results for orders placed or performed during the hospital encounter of 10/03/19 (from the past 24 hour(s))   Glucose by meter   Result Value Ref Range    Glucose 210 (H) 70 - 99 mg/dL   CBC with platelets, differential   Result Value Ref Range    WBC 9.2 4.0 - 11.0 10e9/L    RBC Count 4.74 3.8 - 5.2 10e12/L    Hemoglobin 15.1 11.7 - 15.7 g/dL    Hematocrit 44.7 35.0 - 47.0 %    MCV 94 78 - 100 fl    MCH 31.9 26.5 - 33.0 pg    MCHC 33.8 31.5 - 36.5 g/dL    RDW 13.0 10.0 - 15.0 %    Platelet Count 197 150 - 450 10e9/L    Diff Method Automated Method     % Neutrophils 79.4 %    % Lymphocytes 9.3 %    % Monocytes 10.6 %    % Eosinophils 0.0 %    % Basophils 0.3 %    % Immature Granulocytes 0.4 %    Nucleated RBCs 0 0 /100    Absolute Neutrophil 7.3 1.6 - 8.3 10e9/L    Absolute Lymphocytes 0.9 0.8 - 5.3 10e9/L    Absolute Monocytes 1.0 0.0 - 1.3 10e9/L    Absolute Eosinophils 0.0 0.0 - 0.7 10e9/L    Absolute Basophils 0.0 0.0 - 0.2 10e9/L    Abs Immature Granulocytes 0.0 0 - 0.4 10e9/L    Absolute Nucleated RBC 0.0    Comprehensive metabolic panel   Result Value Ref Range    Sodium 132 (L) 133 - 144 mmol/L    Potassium 3.9 3.4 - 5.3 mmol/L    Chloride 97 94 - 109 mmol/L    Carbon Dioxide 27 20 - 32 mmol/L    Anion Gap 8 3 - 14 mmol/L    Glucose 207 (H) 70 - 99 mg/dL    Urea Nitrogen 19 7 - 30 mg/dL    Creatinine 0.69 0.52 - 1.04 mg/dL    GFR Estimate 82 >60 mL/min/[1.73_m2]    GFR Estimate If Black >90 >60 mL/min/[1.73_m2]    Calcium 8.8 8.5 - 10.1 mg/dL    Bilirubin Total 0.9 0.2 - 1.3 mg/dL    Albumin 3.4 3.4 - 5.0 g/dL    Protein Total 7.3 6.8 - 8.8 g/dL    Alkaline Phosphatase 104 40 - 150 U/L    ALT 28 0 - 50 U/L    AST 39 0 - 45  U/L   Hemoglobin A1c   Result Value Ref Range    Hemoglobin A1C 7.7 (H) 0 - 5.6 %   Routine UA with microscopic   Result Value Ref Range    Color Urine Priscilla     Appearance Urine Cloudy     Glucose Urine >499 (A) NEG^Negative mg/dL    Bilirubin Urine Negative NEG^Negative    Ketones Urine 20 (A) NEG^Negative mg/dL    Specific Gravity Urine 1.021 1.003 - 1.035    Blood Urine Moderate (A) NEG^Negative    pH Urine 5.0 5.0 - 7.0 pH    Protein Albumin Urine 100 (A) NEG^Negative mg/dL    Urobilinogen mg/dL 4.0 (H) 0.0 - 2.0 mg/dL    Nitrite Urine Negative NEG^Negative    Leukocyte Esterase Urine Large (A) NEG^Negative    Source Catheterized Urine     WBC Urine >182 (H) 0 - 5 /HPF    RBC Urine 6 (H) 0 - 2 /HPF    WBC Clumps Present (A) NEG^Negative /HPF    Bacteria Urine Few (A) NEG^Negative /HPF   Lactic acid whole blood   Result Value Ref Range    Lactic Acid 1.1 0.7 - 2.0 mmol/L   Urine Culture   Result Value Ref Range    Specimen Description Catheterized Urine     Special Requests Specimen received in preservative     Culture Micro (A)      >100,000 colonies/mL  Escherichia coli  Susceptibility testing in progress     Blood culture   Result Value Ref Range    Specimen Description Blood Right Arm     Special Requests Aerobic and anaerobic bottles received     Culture Micro No growth after 14 hours    Blood culture   Result Value Ref Range    Specimen Description Blood Right Hand     Special Requests Received in aerobic bottle only     Culture Micro No growth after 14 hours    XR Chest 2 Views    Narrative    CHEST TWO VIEWS  10/3/2019 2:26 PM     HISTORY: Fever.    COMPARISON: None.      Impression    IMPRESSION: Mild infiltrate at the left lung base medially is  suspicious for pneumonia. The right lung is clear. No pleural  effusions. There is mild pulmonary venous congestion.    CRUZ FUNES MD   CT Head w/o Contrast    Narrative    CT SCAN OF THE HEAD WITHOUT CONTRAST   10/3/2019 2:45 PM     HISTORY: Fall;  confusion.    TECHNIQUE: Axial images of the head and coronal reformations without  IV contrast material. Radiation dose for this scan was reduced using  automated exposure control, adjustment of the mA and/or kV according  to patient size, or iterative reconstruction technique.    COMPARISON: 5/10/2013    FINDINGS: There is no evidence of intracranial hemorrhage, mass, acute  infarct or anomaly. There is moderate generalized brain parenchymal  volume loss. There is moderate patchy low attenuation in the white  matter of the cerebral hemispheres consistent with sequelae of small  vessel ischemic disease. Mild prominence of the lateral ventricles  likely related to central white matter volume loss.    Mild scattered mucosal thickening in the ethmoid air cells. Mastoid  and middle ear cavities are clear. The bony calvarium and bones of the  skull base appear intact.       Impression    IMPRESSION: No evidence of acute intracranial hemorrhage, mass, or  herniation.      ANDRADE ZAVALA MD   Glucose by meter   Result Value Ref Range    Glucose 387 (H) 70 - 99 mg/dL   Glucose by meter   Result Value Ref Range    Glucose 376 (H) 70 - 99 mg/dL   Glucose by meter   Result Value Ref Range    Glucose 316 (H) 70 - 99 mg/dL   Glucose by meter   Result Value Ref Range    Glucose 269 (H) 70 - 99 mg/dL   Glucose by meter   Result Value Ref Range    Glucose 253 (H) 70 - 99 mg/dL   Glucose by meter   Result Value Ref Range    Glucose 227 (H) 70 - 99 mg/dL   Basic metabolic panel   Result Value Ref Range    Sodium 137 133 - 144 mmol/L    Potassium 3.5 3.4 - 5.3 mmol/L    Chloride 103 94 - 109 mmol/L    Carbon Dioxide 25 20 - 32 mmol/L    Anion Gap 9 3 - 14 mmol/L    Glucose 218 (H) 70 - 99 mg/dL    Urea Nitrogen 20 7 - 30 mg/dL    Creatinine 0.77 0.52 - 1.04 mg/dL    GFR Estimate 72 >60 mL/min/[1.73_m2]    GFR Estimate If Black 83 >60 mL/min/[1.73_m2]    Calcium 8.0 (L) 8.5 - 10.1 mg/dL   CBC with platelets   Result Value Ref Range     "WBC 7.0 4.0 - 11.0 10e9/L    RBC Count 3.93 3.8 - 5.2 10e12/L    Hemoglobin 12.2 11.7 - 15.7 g/dL    Hematocrit 37.5 35.0 - 47.0 %    MCV 95 78 - 100 fl    MCH 31.0 26.5 - 33.0 pg    MCHC 32.5 31.5 - 36.5 g/dL    RDW 13.3 10.0 - 15.0 %    Platelet Count 175 150 - 450 10e9/L   Glucose by meter   Result Value Ref Range    Glucose 204 (H) 70 - 99 mg/dL       Assessment and Plan:    Ana Easley is a 79 year old female admitted on 10/3/2019. She has history of type 1 diabetes, hypertension, hyperlipidemia, hypothyroidism and iron deficiency. She presents with confusion.     Urinary Tract Infection-ecoli and sepsis (mental status and fever)  UA shows blood, leukocyte esterase, > 182 WBC, and bacteria. Vitals show temp of 101.6 otherwise stable. Labs unremarkable aside from UA. Suspect UTI causing febrile illness and confusion. Prior urine culture in 2016 grew e.coli, no resistance listed on report. Started on IV ceftriaxone in ED.  Antibiotic: IV ceftriaxone (started 10/3/19)  Blood cultures-ngtd     Acute Metabolic Encephalopathy due to UTI  Confused at home, found by neighbor disheveled. On admission appears to be mildly forgetful at times and states she feels a little foggy in her thinking. Oriented to self, location, situation but not time \"I was wrong before and I'm not sure now so I'm not going to say\", states it is fall and the year is \"20\". UA positive. CXR with possible left lower lobe consolidation though more chronic-appearing, no prior comparison available. CT head negative. Neurological exam non-focal. No concerning medications or other concerning metabolic derrangements. Suspect related to current Illness.    cxr with possible left base infiltrate  Doubt pn, but will add zithromax for now.     Hypertension  Chronic. Blood pressures reviewed, elevated in ED. Suspect in part related to stress/transfer to floor.  - continue home lisinopril  - prn labetalol ordered     Diabetes Mellitus, Type I  Chronic.  " Last a1C 8.0% on 9/2019 per [facility] labs. Managed at home with 10 units of Tresbia in the morning and 1 unit Novolog per 20g carbs with meals. Difficulty recognizing hypoglycemia so goal has been to avoid hypoglycemic episodes rather than tight control.   - continue PTA prandial insulin and long acting insulin  -medium sliding scale insulin (changed to this after having hyperglycemia).  - hypo/hyperglycemia protocol with blood glucose monitoring     Currently glucose 218 by serum this morning.     Hyperlipidemia  Chronic.  - continue home atorvastatin     Hypothyroidism  Chronic.   - continue home levothyroxine     Iron Deficiency Anemia  Chronic. Hemoglobin 15.1 on admit. Stable.  - continue home iron     Diet: Consistent Carbohydrate Diet 2849-5023 Calories: Moderate Consistent CHO (4-6 CHO units/meal)    DVT Prophylaxis: Enoxaparin (Lovenox) SQ  Fournier Catheter: not present  Code Status: Full code, presumed/prior, not discussed on admit due to confusion     Plan-continue rocephin, fluids to 50, continue current insulin regimen. Add oral zithromax. Follow cultures.

## 2019-10-04 NOTE — PLAN OF CARE
Last fever was at 4 AM this morning, temperature has remained down today.  Patient is intermittently confused but knows she's in the hospital.  Is pleasant and cooperative with cares, waits appropriately for assistance with activity.  Ambulating with walker and sba, patient is weak and moving slow. No complaints of pain or difficulty voiding.  Eating ok.  Blood sugars 204 and 302 before meals, insulin given per orders.  Receiving IV and oral antibiotics for UTI and possible pneumonia.

## 2019-10-05 ENCOUNTER — APPOINTMENT (OUTPATIENT)
Dept: PHYSICAL THERAPY | Facility: CLINIC | Age: 79
DRG: 871 | End: 2019-10-05
Payer: MEDICARE

## 2019-10-05 LAB
ANION GAP SERPL CALCULATED.3IONS-SCNC: 8 MMOL/L (ref 3–14)
BUN SERPL-MCNC: 22 MG/DL (ref 7–30)
CALCIUM SERPL-MCNC: 8 MG/DL (ref 8.5–10.1)
CHLORIDE SERPL-SCNC: 101 MMOL/L (ref 94–109)
CO2 SERPL-SCNC: 25 MMOL/L (ref 20–32)
CREAT SERPL-MCNC: 0.78 MG/DL (ref 0.52–1.04)
ERYTHROCYTE [DISTWIDTH] IN BLOOD BY AUTOMATED COUNT: 13.5 % (ref 10–15)
GFR SERPL CREATININE-BSD FRML MDRD: 71 ML/MIN/{1.73_M2}
GLUCOSE BLDC GLUCOMTR-MCNC: 220 MG/DL (ref 70–99)
GLUCOSE BLDC GLUCOMTR-MCNC: 234 MG/DL (ref 70–99)
GLUCOSE BLDC GLUCOMTR-MCNC: 240 MG/DL (ref 70–99)
GLUCOSE BLDC GLUCOMTR-MCNC: 277 MG/DL (ref 70–99)
GLUCOSE BLDC GLUCOMTR-MCNC: 293 MG/DL (ref 70–99)
GLUCOSE BLDC GLUCOMTR-MCNC: 313 MG/DL (ref 70–99)
GLUCOSE BLDC GLUCOMTR-MCNC: 357 MG/DL (ref 70–99)
GLUCOSE SERPL-MCNC: 299 MG/DL (ref 70–99)
HCT VFR BLD AUTO: 37.7 % (ref 35–47)
HGB BLD-MCNC: 12.2 G/DL (ref 11.7–15.7)
MCH RBC QN AUTO: 31.2 PG (ref 26.5–33)
MCHC RBC AUTO-ENTMCNC: 32.4 G/DL (ref 31.5–36.5)
MCV RBC AUTO: 96 FL (ref 78–100)
PLATELET # BLD AUTO: 193 10E9/L (ref 150–450)
POTASSIUM SERPL-SCNC: 3.8 MMOL/L (ref 3.4–5.3)
RBC # BLD AUTO: 3.91 10E12/L (ref 3.8–5.2)
SODIUM SERPL-SCNC: 134 MMOL/L (ref 133–144)
WBC # BLD AUTO: 6.3 10E9/L (ref 4–11)

## 2019-10-05 PROCEDURE — 00000146 ZZHCL STATISTIC GLUCOSE BY METER IP

## 2019-10-05 PROCEDURE — 12000000 ZZH R&B MED SURG/OB

## 2019-10-05 PROCEDURE — 99233 SBSQ HOSP IP/OBS HIGH 50: CPT | Performed by: FAMILY MEDICINE

## 2019-10-05 PROCEDURE — 25800030 ZZH RX IP 258 OP 636: Performed by: FAMILY MEDICINE

## 2019-10-05 PROCEDURE — 36415 COLL VENOUS BLD VENIPUNCTURE: CPT | Performed by: FAMILY MEDICINE

## 2019-10-05 PROCEDURE — 97116 GAIT TRAINING THERAPY: CPT | Mod: GP

## 2019-10-05 PROCEDURE — 97110 THERAPEUTIC EXERCISES: CPT | Mod: GP

## 2019-10-05 PROCEDURE — 25000132 ZZH RX MED GY IP 250 OP 250 PS 637: Mod: GY | Performed by: PHYSICIAN ASSISTANT

## 2019-10-05 PROCEDURE — 25000128 H RX IP 250 OP 636: Performed by: PHYSICIAN ASSISTANT

## 2019-10-05 PROCEDURE — 25000132 ZZH RX MED GY IP 250 OP 250 PS 637: Mod: GY | Performed by: FAMILY MEDICINE

## 2019-10-05 PROCEDURE — 25000128 H RX IP 250 OP 636: Performed by: FAMILY MEDICINE

## 2019-10-05 PROCEDURE — 85027 COMPLETE CBC AUTOMATED: CPT | Performed by: FAMILY MEDICINE

## 2019-10-05 PROCEDURE — 80048 BASIC METABOLIC PNL TOTAL CA: CPT | Performed by: FAMILY MEDICINE

## 2019-10-05 RX ADMIN — INSULIN DEGLUDEC INJECTION 10 UNITS: 100 INJECTION, SOLUTION SUBCUTANEOUS at 08:58

## 2019-10-05 RX ADMIN — INSULIN ASPART 3 UNITS: 100 INJECTION, SOLUTION INTRAVENOUS; SUBCUTANEOUS at 11:56

## 2019-10-05 RX ADMIN — AZITHROMYCIN 250 MG: 250 TABLET, FILM COATED ORAL at 08:54

## 2019-10-05 RX ADMIN — CEFTRIAXONE SODIUM 1 G: 1 INJECTION, SOLUTION INTRAVENOUS at 13:13

## 2019-10-05 RX ADMIN — INSULIN ASPART 2 UNITS: 100 INJECTION, SOLUTION INTRAVENOUS; SUBCUTANEOUS at 17:32

## 2019-10-05 RX ADMIN — ENOXAPARIN SODIUM 40 MG: 40 INJECTION SUBCUTANEOUS at 20:55

## 2019-10-05 RX ADMIN — INSULIN ASPART 1 UNITS: 100 INJECTION, SOLUTION INTRAVENOUS; SUBCUTANEOUS at 08:58

## 2019-10-05 RX ADMIN — LEVOTHYROXINE SODIUM 125 MCG: 125 TABLET ORAL at 06:51

## 2019-10-05 RX ADMIN — SODIUM CHLORIDE: 9 INJECTION, SOLUTION INTRAVENOUS at 08:54

## 2019-10-05 RX ADMIN — LISINOPRIL 2.5 MG: 2.5 TABLET ORAL at 08:54

## 2019-10-05 RX ADMIN — ATORVASTATIN CALCIUM 40 MG: 20 TABLET, FILM COATED ORAL at 08:54

## 2019-10-05 RX ADMIN — FERROUS SULFATE TAB 325 MG (65 MG ELEMENTAL FE) 325 MG: 325 (65 FE) TAB at 11:55

## 2019-10-05 ASSESSMENT — ACTIVITIES OF DAILY LIVING (ADL)
ADLS_ACUITY_SCORE: 16
ADLS_ACUITY_SCORE: 17
ADLS_ACUITY_SCORE: 17
ADLS_ACUITY_SCORE: 18
ADLS_ACUITY_SCORE: 16
ADLS_ACUITY_SCORE: 16

## 2019-10-05 NOTE — PLAN OF CARE
Pt is alert and oriented w/intermittent confusion/forgetfulness. Had fever of 102.1 at 2000 last night, Tylenol given and effective. LS clear, RA 95%. Uses own walker and asst 1 to ambulate to bathroom, urine is yellow and cloudy. Diabetic diet. Blood glucose taken every 4 hours and treated. NS 50 ml/hr. IV in right hand. Generalized weakness. From home alone, neighbor helps out.

## 2019-10-05 NOTE — PLAN OF CARE
No fever again now since last evening at 8 PM.  Patient denies any pain.  Voiding fine, urine is clearer today.  Appetite is normal per patient.  Ambulated in [place] with walker and sba/therapy.  Saline locked IV now after afternoon IV antibiotic dose.  Patient is somewhat forgetful at times but otherwise has been alert and oriented and calling appropriately.

## 2019-10-05 NOTE — PROGRESS NOTES
[facility]      Subjective:  Vague historian  She doesn't really report any symptoms.  Perhaps some mild cough-but nurse hasn't observed this.  No uti symptoms or abd pain  Had fever again last night  Ate breakfast well      Review of Systems:  CONSTITUTIONAL: NEGATIVE for fever, chills, change in weight  INTEGUMENTARY/SKIN: NEGATIVE for worrisome rashes, moles or lesions  EYES: NEGATIVE for vision changes or irritation  ENT/MOUTH: NEGATIVE for ear, mouth and throat problems  RESP:sl cough  BREAST: NEGATIVE for masses, tenderness or discharge  CV: NEGATIVE for chest pain, palpitations or peripheral edema  GI: NEGATIVE for nausea, abdominal pain, heartburn, or change in bowel habits  : NEGATIVE for frequency, dysuria, or hematuria  MUSCULOSKELETAL: NEGATIVE for significant arthralgias or myalgia  NEURO: NEGATIVE for weakness, dizziness or paresthesias  ENDOCRINE: NEGATIVE for temperature intolerance, skin/hair changes  HEME: NEGATIVE for bleeding problems  PSYCHIATRIC: NEGATIVE for changes in mood or affect    Physical Exam:  Vitals Were Reviewed    Patient Vitals for the past 16 hrs:   BP Temp Temp src Pulse Heart Rate Resp SpO2   10/05/19 0700 (!) 169/72 98.7  F (37.1  C) Oral 68 -- 18 94 %   10/05/19 0409 (!) 166/57 98.2  F (36.8  C) Oral 68 -- 20 93 %   10/05/19 0002 133/68 98.1  F (36.7  C) Oral 66 -- 20 95 %   10/04/19 2115 -- 98.9  F (37.2  C) Oral -- -- -- --   10/04/19 2007 (!) 164/71 102.1  F (38.9  C) Oral 80 80 20 95 %         Intake/Output Summary (Last 24 hours) at 10/5/2019 1103  Last data filed at 10/5/2019 0300  Gross per 24 hour   Intake --   Output 600 ml   Net -600 ml       GENERAL APPEARANCE: healthy, alert and no distress  RESP: a few bibasilar crackles  CV: regular rate and rhythm, normal S1 S2, no S3 or S4 and no murmur, click or rub   ABDOMEN: soft, nontender, no HSM or masses and bowel sounds normal  MS: no clubbing, cyanosis; no edema  SKIN: clear without  significant rashes or lesions    Lab:  Recent Labs   Lab Test 10/04/19  0531 10/03/19  1311    132*   POTASSIUM 3.5 3.9   CHLORIDE 103 97   CO2 25 27   ANIONGAP 9 8   * 207*   BUN 20 19   CR 0.77 0.69   GRANT 8.0* 8.8     CBC RESULTS:   Recent Labs   Lab Test 10/04/19  0531 10/03/19  1311   WBC 7.0 9.2   RBC 3.93 4.74   HGB 12.2 15.1   HCT 37.5 44.7    197       Results for orders placed or performed during the hospital encounter of 10/03/19 (from the past 24 hour(s))   Glucose by meter   Result Value Ref Range    Glucose 302 (H) 70 - 99 mg/dL   Glucose by meter   Result Value Ref Range    Glucose 271 (H) 70 - 99 mg/dL   Glucose by meter   Result Value Ref Range    Glucose 269 (H) 70 - 99 mg/dL   Glucose by meter   Result Value Ref Range    Glucose 313 (H) 70 - 99 mg/dL   Glucose by meter   Result Value Ref Range    Glucose 240 (H) 70 - 99 mg/dL   Glucose by meter   Result Value Ref Range    Glucose 220 (H) 70 - 99 mg/dL       Assessment and Plan:    Ana Easley is a 79 year old female admitted on 10/3/2019. She has history of type 1 diabetes, hypertension, hyperlipidemia, hypothyroidism and iron deficiency. She presents with confusion.     Urinary Tract Infection-ecoli and sepsis (mental status and fever)  UA shows blood, leukocyte esterase, > 182 WBC, and bacteria. Vitals show temp of 101.6 otherwise stable. Labs unremarkable aside from UA. Suspect UTI causing febrile illness and confusion. Prior urine culture in 2016 grew e.coli, no resistance listed on report. Started on IV ceftriaxone in ED.  Antibiotic: IV ceftriaxone (started 10/3/19)  Blood cultures-neg at 48 hours  UC with e coli , sens pending, AM wbc is pending.  Had temp to 102.1 overnight     Acute Metabolic Encephalopathy due to UTI  Confused at home, found by neighbor disheveled. On admission appears to be mildly forgetful at times and states she feels a little foggy in her thinking. Oriented to self, location, situation but  "not time \"I was wrong before and I'm not sure now so I'm not going to say\", states it is fall and the year is \"20\". UA positive. CXR with possible left lower lobe consolidation though more chronic-appearing, no prior comparison available. CT head negative. Neurological exam non-focal. No concerning medications or other concerning metabolic derrangements. Suspect related to current Illness.     Currently it seems like she has improved from the admit exam.     cxr with possible left base infiltrate  Doubt pn, but  zithromax added 10/4/19.     Hypertension  Chronic. Blood pressures reviewed, elevated in ED. Suspect in part related to stress/transfer to floor.  - continue home lisinopril  - prn labetalol ordered     Diabetes Mellitus, Type I  Chronic.  Last a1C 8.0% on 9/2019 per [facility] labs. Managed at home with 10 units of Tresbia in the morning and 1 unit Novolog per 20g carbs with meals. Difficulty recognizing hypoglycemia so goal has been to avoid hypoglycemic episodes rather than tight control.   - continue PTA prandial insulin and long acting insulin  -medium sliding scale insulin (changed to this after having hyperglycemia).  - hypo/hyperglycemia protocol with blood glucose monitoring     Currently glucose 220 by glucometer meter this AM     Hyperlipidemia  Chronic.  - continue home atorvastatin     Hypothyroidism  Chronic.   - continue home levothyroxine     Iron Deficiency Anemia  Chronic. Hemoglobin 15.1 on admit. Stable.  - continue home iron     Diet: Consistent Carbohydrate Diet 5681-3083 Calories: Moderate Consistent CHO (4-6 CHO units/meal)    DVT Prophylaxis: Enoxaparin (Lovenox) SQ  Fournier Catheter: not present  Code Status: Full code, presumed/prior, not discussed on admit due to confusion     Plan-continue rocephin/zith, stop fluids, continue current insulin regimen.  Follow cultures. AM lab is pending.  I think fever and encephalopathy is most likely related to UTI.                "

## 2019-10-05 NOTE — PLAN OF CARE
Discharge Planner PT   Patient plan for discharge: Adam[place] about going home  Current status: Transfers sit <> stand with SBA. Amb with 4WW and '.  Barriers to return to prior living situation: lives alone, fall risk  Recommendations for discharge: Home with home PT  Rationale for recommendations: Pt would benefit assessment of safety in home and strengthening and balance training to prevent falls       Entered by: Miriam Weeks 10/05/2019 11:35 AM

## 2019-10-06 ENCOUNTER — APPOINTMENT (OUTPATIENT)
Dept: PHYSICAL THERAPY | Facility: CLINIC | Age: 79
DRG: 871 | End: 2019-10-06
Payer: MEDICARE

## 2019-10-06 LAB
ANION GAP SERPL CALCULATED.3IONS-SCNC: 7 MMOL/L (ref 3–14)
BUN SERPL-MCNC: 17 MG/DL (ref 7–30)
CALCIUM SERPL-MCNC: 8.2 MG/DL (ref 8.5–10.1)
CHLORIDE SERPL-SCNC: 103 MMOL/L (ref 94–109)
CO2 SERPL-SCNC: 27 MMOL/L (ref 20–32)
CREAT SERPL-MCNC: 0.71 MG/DL (ref 0.52–1.04)
ERYTHROCYTE [DISTWIDTH] IN BLOOD BY AUTOMATED COUNT: 13.5 % (ref 10–15)
GFR SERPL CREATININE-BSD FRML MDRD: 80 ML/MIN/{1.73_M2}
GLUCOSE BLDC GLUCOMTR-MCNC: 170 MG/DL (ref 70–99)
GLUCOSE BLDC GLUCOMTR-MCNC: 246 MG/DL (ref 70–99)
GLUCOSE BLDC GLUCOMTR-MCNC: 252 MG/DL (ref 70–99)
GLUCOSE BLDC GLUCOMTR-MCNC: 261 MG/DL (ref 70–99)
GLUCOSE BLDC GLUCOMTR-MCNC: 303 MG/DL (ref 70–99)
GLUCOSE SERPL-MCNC: 203 MG/DL (ref 70–99)
HCT VFR BLD AUTO: 40.5 % (ref 35–47)
HGB BLD-MCNC: 13.3 G/DL (ref 11.7–15.7)
MCH RBC QN AUTO: 31.6 PG (ref 26.5–33)
MCHC RBC AUTO-ENTMCNC: 32.8 G/DL (ref 31.5–36.5)
MCV RBC AUTO: 96 FL (ref 78–100)
PLATELET # BLD AUTO: 198 10E9/L (ref 150–450)
POTASSIUM SERPL-SCNC: 3.9 MMOL/L (ref 3.4–5.3)
RBC # BLD AUTO: 4.21 10E12/L (ref 3.8–5.2)
SODIUM SERPL-SCNC: 137 MMOL/L (ref 133–144)
WBC # BLD AUTO: 5.8 10E9/L (ref 4–11)

## 2019-10-06 PROCEDURE — 99233 SBSQ HOSP IP/OBS HIGH 50: CPT | Performed by: FAMILY MEDICINE

## 2019-10-06 PROCEDURE — 97116 GAIT TRAINING THERAPY: CPT | Mod: GP

## 2019-10-06 PROCEDURE — 85027 COMPLETE CBC AUTOMATED: CPT | Performed by: FAMILY MEDICINE

## 2019-10-06 PROCEDURE — 25000128 H RX IP 250 OP 636: Performed by: PHYSICIAN ASSISTANT

## 2019-10-06 PROCEDURE — 12000000 ZZH R&B MED SURG/OB

## 2019-10-06 PROCEDURE — 80048 BASIC METABOLIC PNL TOTAL CA: CPT | Performed by: FAMILY MEDICINE

## 2019-10-06 PROCEDURE — 36415 COLL VENOUS BLD VENIPUNCTURE: CPT | Performed by: FAMILY MEDICINE

## 2019-10-06 PROCEDURE — 25000128 H RX IP 250 OP 636: Performed by: FAMILY MEDICINE

## 2019-10-06 PROCEDURE — 00000146 ZZHCL STATISTIC GLUCOSE BY METER IP

## 2019-10-06 PROCEDURE — 25000132 ZZH RX MED GY IP 250 OP 250 PS 637: Mod: GY | Performed by: FAMILY MEDICINE

## 2019-10-06 PROCEDURE — 25000132 ZZH RX MED GY IP 250 OP 250 PS 637: Mod: GY | Performed by: PHYSICIAN ASSISTANT

## 2019-10-06 PROCEDURE — 97110 THERAPEUTIC EXERCISES: CPT | Mod: GP

## 2019-10-06 RX ORDER — NICOTINE POLACRILEX 4 MG
15-30 LOZENGE BUCCAL
Status: DISCONTINUED | OUTPATIENT
Start: 2019-10-06 | End: 2019-10-08 | Stop reason: HOSPADM

## 2019-10-06 RX ORDER — DEXTROSE MONOHYDRATE 25 G/50ML
25-50 INJECTION, SOLUTION INTRAVENOUS
Status: DISCONTINUED | OUTPATIENT
Start: 2019-10-06 | End: 2019-10-08 | Stop reason: HOSPADM

## 2019-10-06 RX ADMIN — LEVOTHYROXINE SODIUM 125 MCG: 125 TABLET ORAL at 05:57

## 2019-10-06 RX ADMIN — ATORVASTATIN CALCIUM 40 MG: 20 TABLET, FILM COATED ORAL at 08:50

## 2019-10-06 RX ADMIN — FERROUS SULFATE TAB 325 MG (65 MG ELEMENTAL FE) 325 MG: 325 (65 FE) TAB at 12:07

## 2019-10-06 RX ADMIN — INSULIN DEGLUDEC INJECTION 10 UNITS: 100 INJECTION, SOLUTION SUBCUTANEOUS at 08:51

## 2019-10-06 RX ADMIN — INSULIN ASPART 1 UNITS: 100 INJECTION, SOLUTION INTRAVENOUS; SUBCUTANEOUS at 08:51

## 2019-10-06 RX ADMIN — CEFTRIAXONE SODIUM 1 G: 1 INJECTION, SOLUTION INTRAVENOUS at 14:00

## 2019-10-06 RX ADMIN — LISINOPRIL 2.5 MG: 2.5 TABLET ORAL at 08:50

## 2019-10-06 RX ADMIN — ENOXAPARIN SODIUM 40 MG: 40 INJECTION SUBCUTANEOUS at 19:36

## 2019-10-06 RX ADMIN — AZITHROMYCIN 250 MG: 250 TABLET, FILM COATED ORAL at 08:50

## 2019-10-06 ASSESSMENT — ACTIVITIES OF DAILY LIVING (ADL)
ADLS_ACUITY_SCORE: 16

## 2019-10-06 NOTE — PROGRESS NOTES
[facility]      Subjective:  She feels better  No fever or chills  Eating  No pain    Review of Systems:  CONSTITUTIONAL: NEGATIVE for fever, chills, change in weight  INTEGUMENTARY/SKIN: NEGATIVE for worrisome rashes, moles or lesions  EYES: NEGATIVE for vision changes or irritation  ENT/MOUTH: NEGATIVE for ear, mouth and throat problems  RESP: NEGATIVE for significant cough or SOB  BREAST: NEGATIVE for masses, tenderness or discharge  CV: NEGATIVE for chest pain, palpitations or peripheral edema  GI: NEGATIVE for nausea, abdominal pain, heartburn, or change in bowel habits  : NEGATIVE for frequency, dysuria, or hematuria  MUSCULOSKELETAL: NEGATIVE for significant arthralgias or myalgia  NEURO:  No reported complaints  ENDOCRINE: NEGATIVE for temperature intolerance, skin/hair changes  HEME: NEGATIVE for bleeding problems  PSYCHIATRIC: NEGATIVE for changes in mood or affect    Physical Exam:  Vitals Were Reviewed    Patient Vitals for the past 16 hrs:   BP Temp Temp src Pulse Heart Rate Resp SpO2   10/06/19 1025 (!) 151/75 -- -- -- 63 -- --   10/06/19 0803 (!) 182/77 97  F (36.1  C) Oral 68 -- 16 95 %   10/06/19 0300 (!) 164/74 -- -- 66 -- -- 93 %   10/05/19 2254 (!) 184/82 98.6  F (37  C) Oral 74 -- 18 92 %         Intake/Output Summary (Last 24 hours) at 10/6/2019 1146  Last data filed at 10/5/2019 1400  Gross per 24 hour   Intake 200 ml   Output 200 ml   Net 0 ml       GENERAL APPEARANCE: healthy, alert and no distress  EYES: conjunctiva clear, eyes grossly normal  RESP: lungs clear to auscultation - no rales, rhonchi or wheezes  CV: regular rate and rhythm, normal S1 S2, no S3 or S4 and no murmur, click or rub   ABDOMEN: soft, nontender, no HSM or masses and bowel sounds normal  MS: no clubbing, cyanosis; no edema  SKIN: clear without significant rashes or lesions  NEURO: she is just slightly confused, improved from admit, may be at baseline    Lab:  Recent Labs   Lab Test  10/06/19  0613 10/05/19  1424    134   POTASSIUM 3.9 3.8   CHLORIDE 103 101   CO2 27 25   ANIONGAP 7 8   * 299*   BUN 17 22   CR 0.71 0.78   GRANT 8.2* 8.0*     CBC RESULTS:   Recent Labs   Lab Test 10/06/19  0613 10/05/19  1424   WBC 5.8 6.3   RBC 4.21 3.91   HGB 13.3 12.2   HCT 40.5 37.7    193       Results for orders placed or performed during the hospital encounter of 10/03/19 (from the past 24 hour(s))   CBC with platelets   Result Value Ref Range    WBC 6.3 4.0 - 11.0 10e9/L    RBC Count 3.91 3.8 - 5.2 10e12/L    Hemoglobin 12.2 11.7 - 15.7 g/dL    Hematocrit 37.7 35.0 - 47.0 %    MCV 96 78 - 100 fl    MCH 31.2 26.5 - 33.0 pg    MCHC 32.4 31.5 - 36.5 g/dL    RDW 13.5 10.0 - 15.0 %    Platelet Count 193 150 - 450 10e9/L   Basic metabolic panel   Result Value Ref Range    Sodium 134 133 - 144 mmol/L    Potassium 3.8 3.4 - 5.3 mmol/L    Chloride 101 94 - 109 mmol/L    Carbon Dioxide 25 20 - 32 mmol/L    Anion Gap 8 3 - 14 mmol/L    Glucose 299 (H) 70 - 99 mg/dL    Urea Nitrogen 22 7 - 30 mg/dL    Creatinine 0.78 0.52 - 1.04 mg/dL    GFR Estimate 71 >60 mL/min/[1.73_m2]    GFR Estimate If Black 82 >60 mL/min/[1.73_m2]    Calcium 8.0 (L) 8.5 - 10.1 mg/dL   Glucose by meter   Result Value Ref Range    Glucose 293 (H) 70 - 99 mg/dL   Glucose by meter   Result Value Ref Range    Glucose 234 (H) 70 - 99 mg/dL   Glucose by meter   Result Value Ref Range    Glucose 277 (H) 70 - 99 mg/dL   Glucose by meter   Result Value Ref Range    Glucose 246 (H) 70 - 99 mg/dL   Glucose by meter   Result Value Ref Range    Glucose 170 (H) 70 - 99 mg/dL   CBC with platelets   Result Value Ref Range    WBC 5.8 4.0 - 11.0 10e9/L    RBC Count 4.21 3.8 - 5.2 10e12/L    Hemoglobin 13.3 11.7 - 15.7 g/dL    Hematocrit 40.5 35.0 - 47.0 %    MCV 96 78 - 100 fl    MCH 31.6 26.5 - 33.0 pg    MCHC 32.8 31.5 - 36.5 g/dL    RDW 13.5 10.0 - 15.0 %    Platelet Count 198 150 - 450 10e9/L   Basic metabolic panel   Result Value Ref  Range    Sodium 137 133 - 144 mmol/L    Potassium 3.9 3.4 - 5.3 mmol/L    Chloride 103 94 - 109 mmol/L    Carbon Dioxide 27 20 - 32 mmol/L    Anion Gap 7 3 - 14 mmol/L    Glucose 203 (H) 70 - 99 mg/dL    Urea Nitrogen 17 7 - 30 mg/dL    Creatinine 0.71 0.52 - 1.04 mg/dL    GFR Estimate 80 >60 mL/min/[1.73_m2]    GFR Estimate If Black >90 >60 mL/min/[1.73_m2]    Calcium 8.2 (L) 8.5 - 10.1 mg/dL   Glucose by meter   Result Value Ref Range    Glucose 303 (H) 70 - 99 mg/dL       Assessment and Plan:    Ana Easley is a 79 year old female admitted on 10/3/2019. She has history of type 1 diabetes, hypertension, hyperlipidemia, hypothyroidism and iron deficiency. She presents with confusion.     Urinary Tract Infection-ecoli and sepsis (mental status and fever)  UA showed blood, leukocyte esterase, > 182 WBC, and bacteria.  Suspect UTI causing febrile illness and confusion. Prior urine culture in 2016 grew e.coli, no resistance listed on report. Started on IV ceftriaxone in ED.  Antibiotic: IV ceftriaxone (started 10/3/19)  Blood cultures-neg at 48 hours  UC with e coli , sens to all but septra  Pt has now defervesced.     Acute Metabolic Encephalopathy due to UTI  Confused at home, found by neighbor disheveled. On admission appears to be mildly forgetful at times and states she feels a little foggy in her thinking. Oriented to self, location, situation but not time. . CT head negative.  Suspect related to current Illness.     Currently it seems like she has improved from the admit exam.She seems to have just mild confusion.     cxr with possible left base infiltrate  Doubt pn, but  zithromax added 10/4/19.     Hypertension   Continue home lisinopril  prn labetalol ordered due to high bp's     Diabetes Mellitus, Type I  Chronic.  Last a1C 8.0% on 9/2019 per [facility] labs. Managed at home with 10 units of Tresbia in the morning and 1 unit Novolog per 20g carbs with meals. Difficulty recognizing hypoglycemia so goal has  been to avoid hypoglycemic episodes rather than tight control.   - continue PTA prandial insulin and long acting insulin  -medium sliding scale insulin (changed to this after having hyperglycemia).  - hypo/hyperglycemia protocol with blood glucose monitoring     Currently glucose 170 by glucometer meter this AM  Will continue current regimen (as above pt has had difficulty with recognizing hypoglycemia -so current goal is not tight control)     Hyperlipidemia  Chronic.  - continue home atorvastatin     Hypothyroidism  Chronic.   - continue home levothyroxine     Iron Deficiency Anemia  Chronic. Hemoglobin 15.1 on admit. Stable.  - continue home iron     Diet: Consistent Carbohydrate Diet 4508-5289 Calories: Moderate Consistent CHO (4-6 CHO units/meal)    DVT Prophylaxis: Enoxaparin (Lovenox) SQ  Fournier Catheter: not present  Code Status: Full code, presumed/prior, not discussed on admit due to confusion     Plan-continue rocephin/zith, continue current insulin regimen.  Pt will likely need tcu again. Possibly will be ready to discharge 10/7/19.

## 2019-10-06 NOTE — PLAN OF CARE
Discharge Planner PT   Patient plan for discharge: Home  Current status: Transfers sit <> stand with SBA. Amb with 4WW and '.  Barriers to return to prior living situation: lives alone, fall risk  Recommendations for discharge: Home with home PT  Rationale for recommendations: Pt would benefit assessment of safety in home and strengthening and balance training to prevent falls     Entered by: Miriam Weeks 10/06/2019 8:30 AM

## 2019-10-06 NOTE — PROGRESS NOTES
Patient is alert and oriented but forgetful, pleasant and cooperative. Up to bathroom w/asst of 1 and walker, moves slowly. Blood glucose checked every four hours and treated. Rocephin for UTI, urine is cloudy and odorous. On RA. Denies pain.

## 2019-10-07 ENCOUNTER — APPOINTMENT (OUTPATIENT)
Dept: PHYSICAL THERAPY | Facility: CLINIC | Age: 79
DRG: 871 | End: 2019-10-07
Payer: MEDICARE

## 2019-10-07 ENCOUNTER — APPOINTMENT (OUTPATIENT)
Dept: CT IMAGING | Facility: CLINIC | Age: 79
DRG: 871 | End: 2019-10-07
Attending: PHYSICIAN ASSISTANT
Payer: MEDICARE

## 2019-10-07 ENCOUNTER — APPOINTMENT (OUTPATIENT)
Dept: OCCUPATIONAL THERAPY | Facility: CLINIC | Age: 79
DRG: 871 | End: 2019-10-07
Payer: MEDICARE

## 2019-10-07 LAB
ANION GAP SERPL CALCULATED.3IONS-SCNC: 8 MMOL/L (ref 3–14)
BUN SERPL-MCNC: 13 MG/DL (ref 7–30)
CALCIUM SERPL-MCNC: 8.4 MG/DL (ref 8.5–10.1)
CHLORIDE SERPL-SCNC: 103 MMOL/L (ref 94–109)
CO2 SERPL-SCNC: 28 MMOL/L (ref 20–32)
CREAT SERPL-MCNC: 0.71 MG/DL (ref 0.52–1.04)
ERYTHROCYTE [DISTWIDTH] IN BLOOD BY AUTOMATED COUNT: 13.3 % (ref 10–15)
GFR SERPL CREATININE-BSD FRML MDRD: 80 ML/MIN/{1.73_M2}
GLUCOSE BLDC GLUCOMTR-MCNC: 185 MG/DL (ref 70–99)
GLUCOSE BLDC GLUCOMTR-MCNC: 193 MG/DL (ref 70–99)
GLUCOSE BLDC GLUCOMTR-MCNC: 218 MG/DL (ref 70–99)
GLUCOSE BLDC GLUCOMTR-MCNC: 255 MG/DL (ref 70–99)
GLUCOSE BLDC GLUCOMTR-MCNC: 352 MG/DL (ref 70–99)
GLUCOSE SERPL-MCNC: 184 MG/DL (ref 70–99)
HCT VFR BLD AUTO: 40.1 % (ref 35–47)
HGB BLD-MCNC: 13.2 G/DL (ref 11.7–15.7)
MCH RBC QN AUTO: 31.2 PG (ref 26.5–33)
MCHC RBC AUTO-ENTMCNC: 32.9 G/DL (ref 31.5–36.5)
MCV RBC AUTO: 95 FL (ref 78–100)
PLATELET # BLD AUTO: 229 10E9/L (ref 150–450)
POTASSIUM SERPL-SCNC: 3.8 MMOL/L (ref 3.4–5.3)
RBC # BLD AUTO: 4.23 10E12/L (ref 3.8–5.2)
SODIUM SERPL-SCNC: 139 MMOL/L (ref 133–144)
WBC # BLD AUTO: 4.9 10E9/L (ref 4–11)

## 2019-10-07 PROCEDURE — 85027 COMPLETE CBC AUTOMATED: CPT | Performed by: FAMILY MEDICINE

## 2019-10-07 PROCEDURE — 25000128 H RX IP 250 OP 636: Performed by: FAMILY MEDICINE

## 2019-10-07 PROCEDURE — 36415 COLL VENOUS BLD VENIPUNCTURE: CPT | Performed by: FAMILY MEDICINE

## 2019-10-07 PROCEDURE — 99233 SBSQ HOSP IP/OBS HIGH 50: CPT | Performed by: INTERNAL MEDICINE

## 2019-10-07 PROCEDURE — 97116 GAIT TRAINING THERAPY: CPT | Mod: GP | Performed by: PHYSICAL THERAPIST

## 2019-10-07 PROCEDURE — 25000132 ZZH RX MED GY IP 250 OP 250 PS 637: Mod: GY | Performed by: FAMILY MEDICINE

## 2019-10-07 PROCEDURE — 97535 SELF CARE MNGMENT TRAINING: CPT | Mod: GO

## 2019-10-07 PROCEDURE — 00000146 ZZHCL STATISTIC GLUCOSE BY METER IP

## 2019-10-07 PROCEDURE — 25000128 H RX IP 250 OP 636: Performed by: PHYSICIAN ASSISTANT

## 2019-10-07 PROCEDURE — 80048 BASIC METABOLIC PNL TOTAL CA: CPT | Performed by: FAMILY MEDICINE

## 2019-10-07 PROCEDURE — 70450 CT HEAD/BRAIN W/O DYE: CPT

## 2019-10-07 PROCEDURE — 25000132 ZZH RX MED GY IP 250 OP 250 PS 637: Mod: GY | Performed by: PHYSICIAN ASSISTANT

## 2019-10-07 PROCEDURE — 12000000 ZZH R&B MED SURG/OB

## 2019-10-07 PROCEDURE — 97165 OT EVAL LOW COMPLEX 30 MIN: CPT | Mod: GO

## 2019-10-07 RX ORDER — LISINOPRIL 10 MG/1
10 TABLET ORAL DAILY
Status: DISCONTINUED | OUTPATIENT
Start: 2019-10-08 | End: 2019-10-08 | Stop reason: HOSPADM

## 2019-10-07 RX ORDER — CEFPODOXIME PROXETIL 200 MG/1
200 TABLET, FILM COATED ORAL 2 TIMES DAILY
Qty: 8 TABLET | Refills: 0 | Status: SHIPPED | OUTPATIENT
Start: 2019-10-07 | End: 2019-10-15

## 2019-10-07 RX ORDER — LISINOPRIL 10 MG/1
10 TABLET ORAL DAILY
Qty: 30 TABLET | Refills: 0 | Status: SHIPPED | OUTPATIENT
Start: 2019-10-08

## 2019-10-07 RX ADMIN — LEVOTHYROXINE SODIUM 125 MCG: 125 TABLET ORAL at 06:11

## 2019-10-07 RX ADMIN — ATORVASTATIN CALCIUM 40 MG: 20 TABLET, FILM COATED ORAL at 07:46

## 2019-10-07 RX ADMIN — ENOXAPARIN SODIUM 40 MG: 40 INJECTION SUBCUTANEOUS at 21:58

## 2019-10-07 RX ADMIN — CEFTRIAXONE SODIUM 1 G: 1 INJECTION, SOLUTION INTRAVENOUS at 13:50

## 2019-10-07 RX ADMIN — INSULIN DEGLUDEC INJECTION 10 UNITS: 100 INJECTION, SOLUTION SUBCUTANEOUS at 10:46

## 2019-10-07 RX ADMIN — AZITHROMYCIN 250 MG: 250 TABLET, FILM COATED ORAL at 07:46

## 2019-10-07 RX ADMIN — LISINOPRIL 2.5 MG: 2.5 TABLET ORAL at 07:46

## 2019-10-07 RX ADMIN — FERROUS SULFATE TAB 325 MG (65 MG ELEMENTAL FE) 325 MG: 325 (65 FE) TAB at 11:54

## 2019-10-07 ASSESSMENT — ACTIVITIES OF DAILY LIVING (ADL)
PREVIOUS_RESPONSIBILITIES: MEAL PREP;HOUSEKEEPING;LAUNDRY;MEDICATION MANAGEMENT
ADLS_ACUITY_SCORE: 16
ADLS_ACUITY_SCORE: 18
ADLS_ACUITY_SCORE: 16
ADLS_ACUITY_SCORE: 18
ADLS_ACUITY_SCORE: 16
ADLS_ACUITY_SCORE: 16

## 2019-10-07 NOTE — PLAN OF CARE
Discharge Planner PT   Patient plan for discharge: home with home therapy  Current status: STS up to 4WW at SBA; ambulated 145 feet at CGA, slow but steady, did need verbal cues to keep walker closer to self  Barriers to return to prior living situation: lives alone, fall risk  Recommendations for discharge: home with home therapy   Rationale for recommendations: Pt would benefit assessment of safety in home and strengthening and balance training to prevent falls        Entered by: Fely Lomeli 10/07/2019 3:23 PM

## 2019-10-07 NOTE — PLAN OF CARE
Patient alert and oriented today.  Feeling stronger.  Therapy recommending home with home physical therapy which patient would prefer over TCU.  Ambulating with walker and sba.  Afebrile today.  IV rocephin and oral zithromax given.  Blood sugars mostly in the 200's.

## 2019-10-07 NOTE — PROGRESS NOTES
Pt has fluctuating orientation, around 0245 pt had just returned from a walk in the hallway. Pt was back in bed, blood glucose taken and this author noticed patient staring off, NA was asking pt for name and birthday and pt was not responding appropriately. Pt unable to answer name, time, where they are, situation. VSS. David DENNY and Charge RN at bedside. Neuro assessment pinpoint pupils, equal. No drifts. Pt slow moving and responding, not usual self. Sent for CT for decreased level of consciousness. CT negative.  Denies pain. S/L. Afebrile.

## 2019-10-07 NOTE — PROGRESS NOTES
[facility]    Hospital Medicine   Cross Cover Note  Date of Service: 10/7/2019     I was called at approximately 2:45 AM due to an acute change of mental status.  Per the RN she had been at her baseline mental status throughout the evening and night.  RN entered the room at about 2:30 AM and found her to be staring in the distance and confused about date, time, and location.      Temp: 97.9  F (36.6  C) Temp src: Oral BP: (!) 165/69 Pulse: 64 Heart Rate: 70 Resp: 16 SpO2: 94 % O2 Device: None (Room air)      General: Appears calm, comfortable, nontoxic. Answers questions with clear speech. No apparent distress.  Skin: Pink, warm, dry.  Eyes: Sclera are white.  Pupils are constricted and symmetrical.  HENT:  Normocephalic, atraumatic. Oropharynx is moist, without lesions or exudate.  CV: RRR, clear S1/S2 without murmur, rub, or gallop. No lower extremity edema.  Respiratory: Normal respiratory effort.  Musculoskeletal: Moving all extremities well. Normal muscle bulk and tone.  Neuro: Does not know date, day, or location.  Cannot recall where she lives.  CN II-XII grossly intact. Extremity movement is symmetrical.  No facial droop.  Speech is clear.  Finger-nose-finger intact.  Psych: Normal mood and flattened affect.     # Acute encephalopathy  Afebrile.  Not tachycardic, hypotensive, or hypoxic.  WBC trending down since admission. Pupils are quite small, however she has not received opiates during this admission and she exhibits a normal respiratory rate.  Glucose 185.  - Head CT    Volodymyr Park PA-C on 10/7/2019 at 3:06 AM

## 2019-10-07 NOTE — PLAN OF CARE
Patient is alert and oriented except for forgetful and thought it was year 1990.  before lunch. Doctor Rober increased Tresiba and novolog. Dr. Richter was updated on .

## 2019-10-07 NOTE — PROGRESS NOTES
[facility]    Hospitalist Progress Note    Date of Service (when I saw the patient): 10/07/2019    Assessment & Plan   Ana Easley is a 79 year old female admitted on 10/3/2019. She has history of type 1 diabetes, hypertension, hyperlipidemia, hypothyroidism and iron deficiency. She presents with confusion.     Urinary tract Infection due to ecoli with sepsis (mental status and fever)  UA showed blood, leukocyte esterase, > 182 WBC, and bacteria.  Suspect UTI causing febrile illness and confusion. Prior urine culture in 2016 grew e.coli, no resistance listed on report. Started on IV ceftriaxone in ED.  - Continue IV ceftriaxone (started 10/3/19).  Blood cultures from 10/3/19 remain NGTD.  Urine cultures growing E coli, sensitive to all but Bactrim.  Confused again overnight, will observe.     Acute Metabolic Encephalopathy due to UTI  Confused at home, found by neighbor disheveled. On admission appears to be mildly forgetful at times and states she feels a little foggy in her thinking. Oriented to self, location, situation but not time. . CT head negative.  Suspect related to current Illness.  - Confused again overnight.  Will keep and watch tonight.  Will ask OT for safety evaluation as patient was intending to return home, where she lives alone.     cxr with possible left base infiltrate  - Doubt pneumonia, but zithromax added 10/4/19.     Hypertension  - BP remains elevated, will increase lisinopril to 10 mg daily.     Diabetes Mellitus, Type I  Chronic.  Last a1C 8.0% on 9/2019 per [facility] labs. Managed at home with 10 units of Tresbia in the morning and 1 unit Novolog per 20g carbs with meals. Difficulty recognizing hypoglycemia so goal has been to avoid hypoglycemic episodes rather than tight control.   - Uncontrolled hyperglycemia  - Increase Tresiba to 14 units qAM  - Start prandial insulin to 3 unit(s) with breakfast and 2 unit(s) with lunch and dinner.  - Continue  medium sliding scale insulin (changed to this after having hyperglycemia).  - hypo/hyperglycemia protocol with blood glucose monitoring     Hyperlipidemia  Chronic.  - continue home atorvastatin     Hypothyroidism  Chronic.   - continue home levothyroxine     Iron Deficiency Anemia  Chronic. Hemoglobin 15.1 on admit. Stable.  - continue home iron    Disposition: Expected discharge in 1-2 days, pending safe discharge plan.    DVT Prophylaxis: Enoxaparin (Lovenox) SQ  Code Status: Prior    Adrián Richter  Text Page (7 am to 6 pm)    Interval History   The patient is sitting comfortably in her chair eating breakfast.  She has no acute complaints.    -Data reviewed today: I reviewed all new labs and imaging results over the last 24 hours. I personally reviewed no images or EKG's today.    Physical Exam   Temp: 98  F (36.7  C) Temp src: Oral BP: (!) 176/75 Pulse: 66 Heart Rate: 70 Resp: 18 SpO2: 92 % O2 Device: None (Room air)    Vitals:    10/03/19 1256 10/03/19 1903   Weight: 80.3 kg (177 lb) 80.4 kg (177 lb 4 oz)     Vital Signs with Ranges  Temp:  [97.7  F (36.5  C)-98.3  F (36.8  C)] 98  F (36.7  C)  Pulse:  [61-74] 66  Heart Rate:  [70] 70  Resp:  [14-18] 18  BP: (151-187)/(64-83) 176/75  SpO2:  [92 %-95 %] 92 %  No intake/output data recorded.    Gen: Well nourished, debilitated elderly female, no acute distressed  HEENT: Atraumatic, normocephalic  Lungs: Clear to ausculation without wheezes, rhonchi, or rales  Heart: Regular rate and rhythm, no gallops or rubs, no murmurs  GI: Bowel sound normal, no hepatosplenomegaly or masses  Lymph: No lymphadenopathy or edema  Skin: No rashes     Medications       atorvastatin  40 mg Oral Daily     azithromycin  250 mg Oral Daily     cefTRIAXone  1 g Intravenous Q24H     enoxaparin  40 mg Subcutaneous Q24H     ferrous sulfate  325 mg Oral Daily with lunch     insulin aspart  1-7 Units Subcutaneous TID AC     insulin aspart  1-5 Units Subcutaneous At Bedtime     insulin  degludec  10 Units Subcutaneous QAM     levothyroxine  125 mcg Oral QAM AC     lisinopril  2.5 mg Oral Daily     sodium chloride (PF)  3 mL Intracatheter Q8H       Data   Recent Labs   Lab 10/07/19  0526 10/06/19  0613 10/05/19  1424  10/03/19  1311   WBC 4.9 5.8 6.3   < > 9.2   HGB 13.2 13.3 12.2   < > 15.1   MCV 95 96 96   < > 94    198 193   < > 197    137 134   < > 132*   POTASSIUM 3.8 3.9 3.8   < > 3.9   CHLORIDE 103 103 101   < > 97   CO2 28 27 25   < > 27   BUN 13 17 22   < > 19   CR 0.71 0.71 0.78   < > 0.69   ANIONGAP 8 7 8   < > 8   GRANT 8.4* 8.2* 8.0*   < > 8.8   * 203* 299*   < > 207*   ALBUMIN  --   --   --   --  3.4   PROTTOTAL  --   --   --   --  7.3   BILITOTAL  --   --   --   --  0.9   ALKPHOS  --   --   --   --  104   ALT  --   --   --   --  28   AST  --   --   --   --  39    < > = values in this interval not displayed.       Recent Results (from the past 24 hour(s))   CT Head w/o Contrast    Narrative    EXAM: CT HEAD W/O CONTRAST  LOCATION: [facility]  DATE/TIME: 10/7/2019 3:12 AM    INDICATION: Decreased level of consciousness  COMPARISON: 10/3/2019  TECHNIQUE: Routine without IV contrast. Multiplanar reformats. Dose reduction techniques were used.    FINDINGS:  INTRACRANIAL CONTENTS: No intracranial hemorrhage, extraaxial collection, or mass effect.  No CT evidence of acute infarct. Moderate presumed chronic small vessel ischemic changes. Moderate atrophy. Normal ventricles and sulci.     VISUALIZED ORBITS/SINUSES/MASTOIDS: No intraorbital abnormality. No paranasal sinus mucosal disease. No middle ear or mastoid effusion.    BONES/SOFT TISSUES: No acute abnormality.      Impression    IMPRESSION:  1.  No hemorrhage, mass, or mass effect.

## 2019-10-07 NOTE — PROGRESS NOTES
10/07/19 1300   Quick Adds   Type of Visit Initial Occupational Therapy Evaluation   Living Environment   Lives With alone   Living Arrangements house   Home Accessibility no concerns   Living Environment Comment ramp to enter. Helpful neighbors. tubshower combo with shower chair and grab bars.    Self-Care   Equipment Currently Used at Home shower chair;dressing device   Functional Level   Ambulation 1-->assistive equipment   Transferring 1-->assistive equipment   Toileting 0-->independent   Bathing 1-->assistive equipment   Dressing 1-->assistive equipment   Eating 0-->independent   Communication 0-->understands/communicates without difficulty   Swallowing 0-->swallows foods/liquids without difficulty   Cognition 1 - attention or memory deficits   Fall history within last six months yes   Number of times patient has fallen within last six months 1   Which of the above functional risks had a recent onset or change? fall history   General Information   Onset of Illness/Injury or Date of Surgery - Date 10/03/19   Referring Physician Adrián Richter MD   Patient/Family Goals Statement To return home with home therapy    Additional Occupational Profile Info/Pertinent History of Current Problem Ana Easley is a 79 year old female admitted on 10/3/2019. She has history of type 1 diabetes, hypertension, hyperlipidemia, hypothyroidism and iron deficiency. She presents with confusion. UTI, sepsis.    Precautions/Limitations fall precautions   Cognitive Status Examination   Orientation orientation to person, place and time   Level of Consciousness alert   Follows Commands (Cognition) WNL   Memory impaired   Attention No deficits were identified   Cognitive Comment SLUMS=23/30, indicating mild cognitive impairment. Points lost on orientation (day of week), delayed recall (2/5 words, however recalls 8/8 on short story recall) and immediate recall (only able to name 8 animals in 1 minute).    Pain Assessment  "  Patient Currently in Pain Yes, see Vital Sign flowsheet  (L arm)   Transfer Skill: Sit to Stand   Level of [place]: Sit/Stand stand-by assist   Physical Assist/Nonphysical Assist: Sit/Stand 1 person assist   Assistive Device for Transfer: Sit/Stand rolling walker   Transfer Skill: Toilet Transfer   Level of [place]: Toilet stand-by assist   Physical Assist/Nonphysical Assist: Toilet 1 person assist   Assistive Device rolling walker   Upper Body Dressing   Level of [place]: Dress Upper Body independent   Lower Body Dressing   Level of [place]: Dress Lower Body stand-by assist   Physical Assist/Nonphysical Assist: Dress Lower Body 1 person assist   Assistive Device sock-aid   Instrumental Activities of Daily Living (IADL)   Previous Responsibilities meal prep;housekeeping;laundry;medication management   Activities of Daily Living Analysis   Impairments Contributing to Impaired Activities of Daily Living strength decreased   General Therapy Interventions   Planned Therapy Interventions ADL retraining   Clinical Impression   Criteria for Skilled Therapeutic Interventions Met yes, treatment indicated   OT Diagnosis decreased independence with ADLs   Influenced by the following impairments deconditioned.    Assessment of Occupational Performance 1-3 Performance Deficits   Identified Performance Deficits cognition, tub/shower transfer    Clinical Decision Making (Complexity) Low complexity   Therapy Frequency Daily   Predicted Duration of Therapy Intervention (days/wks) 2 days   Anticipated Discharge Disposition Home with Home Therapy   Risks and Benefits of Treatment have been explained. Yes   Patient, Family & other staff in agreement with plan of care Yes   [facility] AM-PAC  \"6 Clicks\" Daily Activity Inpatient Short Form   1. Putting on and taking off regular lower body clothing? 3 - A Little   2. Bathing (including washing, rinsing, drying)? 3 - A Little   3. Toileting, which includes " using toilet, bedpan or urinal? 3 - A Little   4. Putting on and taking off regular upper body clothing? 4 - None   5. Taking care of personal grooming such as brushing teeth? 4 - None   6. Eating meals? 4 - None   Daily Activity Raw Score (Score out of 24.Lower scores equate to lower levels of function) 21   Total Evaluation Time   Total Evaluation Time (Minutes) 10

## 2019-10-07 NOTE — PLAN OF CARE
Discharge Planner OT   Patient plan for discharge: Home with home therapy    Current status: Eval complete. Pt able to demo in room mobility and toilet transfer all with SBA and RW. Discussed benefits of extended tub bench vs. Shower chair for at home use, however pt states she does not want tub bench.     SLUMS complete today. Pt scores 23/30, indicating mild cognitive impairment. Points mostly lost on 5 word delayed recall (2/5) and registration (1/2).   Discussed memory aid techniques for cooking and medication management.     Barriers to return to prior living situation: lives alone.     Recommendations for discharge: Home with home therapy recommend both PT and OT. OT to further assess cognition (would benefit from CPT- cognitive performance test to assist with recommended level of assistance) at this time pt states neighbors check on her daily.     Rationale for recommendations: Unable to complete CPT inpatient.       Entered by: Tonja Cox 10/07/2019 1:51 PM

## 2019-10-08 ENCOUNTER — APPOINTMENT (OUTPATIENT)
Dept: OCCUPATIONAL THERAPY | Facility: CLINIC | Age: 79
DRG: 871 | End: 2019-10-08
Payer: MEDICARE

## 2019-10-08 VITALS
BODY MASS INDEX: 34.8 KG/M2 | TEMPERATURE: 97.5 F | DIASTOLIC BLOOD PRESSURE: 77 MMHG | HEIGHT: 60 IN | RESPIRATION RATE: 16 BRPM | WEIGHT: 177.25 LBS | OXYGEN SATURATION: 94 % | HEART RATE: 63 BPM | SYSTOLIC BLOOD PRESSURE: 166 MMHG

## 2019-10-08 LAB
ANION GAP SERPL CALCULATED.3IONS-SCNC: 6 MMOL/L (ref 3–14)
BUN SERPL-MCNC: 10 MG/DL (ref 7–30)
CALCIUM SERPL-MCNC: 8.6 MG/DL (ref 8.5–10.1)
CHLORIDE SERPL-SCNC: 104 MMOL/L (ref 94–109)
CO2 SERPL-SCNC: 29 MMOL/L (ref 20–32)
CREAT SERPL-MCNC: 0.7 MG/DL (ref 0.52–1.04)
ERYTHROCYTE [DISTWIDTH] IN BLOOD BY AUTOMATED COUNT: 13.2 % (ref 10–15)
GFR SERPL CREATININE-BSD FRML MDRD: 81 ML/MIN/{1.73_M2}
GLUCOSE BLDC GLUCOMTR-MCNC: 202 MG/DL (ref 70–99)
GLUCOSE BLDC GLUCOMTR-MCNC: 209 MG/DL (ref 70–99)
GLUCOSE SERPL-MCNC: 174 MG/DL (ref 70–99)
HCT VFR BLD AUTO: 41 % (ref 35–47)
HGB BLD-MCNC: 13.4 G/DL (ref 11.7–15.7)
MCH RBC QN AUTO: 30.9 PG (ref 26.5–33)
MCHC RBC AUTO-ENTMCNC: 32.7 G/DL (ref 31.5–36.5)
MCV RBC AUTO: 95 FL (ref 78–100)
PLATELET # BLD AUTO: 269 10E9/L (ref 150–450)
POTASSIUM SERPL-SCNC: 3.6 MMOL/L (ref 3.4–5.3)
RBC # BLD AUTO: 4.34 10E12/L (ref 3.8–5.2)
SODIUM SERPL-SCNC: 139 MMOL/L (ref 133–144)
WBC # BLD AUTO: 3.8 10E9/L (ref 4–11)

## 2019-10-08 PROCEDURE — 25000132 ZZH RX MED GY IP 250 OP 250 PS 637: Mod: GY | Performed by: INTERNAL MEDICINE

## 2019-10-08 PROCEDURE — 85027 COMPLETE CBC AUTOMATED: CPT | Performed by: FAMILY MEDICINE

## 2019-10-08 PROCEDURE — 80048 BASIC METABOLIC PNL TOTAL CA: CPT | Performed by: FAMILY MEDICINE

## 2019-10-08 PROCEDURE — 97535 SELF CARE MNGMENT TRAINING: CPT | Mod: GO

## 2019-10-08 PROCEDURE — 99239 HOSP IP/OBS DSCHRG MGMT >30: CPT | Performed by: INTERNAL MEDICINE

## 2019-10-08 PROCEDURE — 25000132 ZZH RX MED GY IP 250 OP 250 PS 637: Mod: GY | Performed by: FAMILY MEDICINE

## 2019-10-08 PROCEDURE — 25000132 ZZH RX MED GY IP 250 OP 250 PS 637: Mod: GY | Performed by: PHYSICIAN ASSISTANT

## 2019-10-08 PROCEDURE — 00000146 ZZHCL STATISTIC GLUCOSE BY METER IP

## 2019-10-08 PROCEDURE — 97116 GAIT TRAINING THERAPY: CPT | Mod: GP | Performed by: REHABILITATION PRACTITIONER

## 2019-10-08 PROCEDURE — 36415 COLL VENOUS BLD VENIPUNCTURE: CPT | Performed by: FAMILY MEDICINE

## 2019-10-08 RX ADMIN — LEVOTHYROXINE SODIUM 125 MCG: 125 TABLET ORAL at 06:23

## 2019-10-08 RX ADMIN — ATORVASTATIN CALCIUM 40 MG: 20 TABLET, FILM COATED ORAL at 08:27

## 2019-10-08 RX ADMIN — LISINOPRIL 10 MG: 10 TABLET ORAL at 08:28

## 2019-10-08 RX ADMIN — AZITHROMYCIN 250 MG: 250 TABLET, FILM COATED ORAL at 08:28

## 2019-10-08 ASSESSMENT — ACTIVITIES OF DAILY LIVING (ADL)
ADLS_ACUITY_SCORE: 18
ADLS_ACUITY_SCORE: 20

## 2019-10-08 NOTE — DISCHARGE SUMMARY
Discharge Summary    Ana Easley MRN# [id number]   YOB: 1940 Age: 79 year old     Date of Admission:  10/3/2019  Date of Discharge:  10/8/2019  Admitting Physician:  Adrián Richter MD  Discharge Physician:  Adrián Richter MD  Discharging Service:  Hospitalist     Primary Provider: Rosaura Pierre          Admission Diagnoses:   Septic encephalopathy [G93.41]  Urinary tract infection with hematuria, site unspecified [N39.0, R31.9]         Brief History of Illness:   Ana Easley is a 79 year old female who was admitted for confusion          Hospital Course:     Urinary tract Infection due to ecoli with sepsis (mental status and fever)  UA showed blood, leukocyte esterase, > 182 WBC, and bacteria.  Suspect UTI causing febrile illness and confusion. Prior urine culture in 2016 grew e.coli, no resistance listed on report. Started on IV ceftriaxone in ED.  - Continue IV ceftriaxone (started 10/3/19).  Blood cultures from 10/3/19 remain NGTD.  Urine cultures growing E coli, sensitive to all but Bactrim.    - Will discharge to complete 4 additional days cefpodoxime     Acute Metabolic Encephalopathy due to UTI  Confused at home, found by neighbor disheveled. On admission appears to be mildly forgetful at times and states she feels a little foggy in her thinking. Oriented to self, location, situation but not time. CT head negative.  Suspect related to current Illness.  - Confused again overnight.  Will keep and watch tonight.  Will ask OT for safety evaluation as patient was intending to return home, where she lives alone.   - Likely has underlying cognitive impairment vs dementia  - Oriented this morning.  Patient to discharge to TCU  - Follow up with PCP     cxr with possible left base infiltrate  - Doubt pneumonia, but zithromax added 10/4/19.  - Patient will discharge on course of cefpodoxime     Hypertension  - BP remains elevated, will increase lisinopril to 10 mg daily.  - Follow  up with PCP     Diabetes Mellitus, Type I  Chronic.  Last a1C 8.0% on 9/2019 per [facility] labs. Managed at home with 10 units of Tresbia in the morning and 1 unit Novolog per 20g carbs with meals. Difficulty recognizing hypoglycemia so goal has been to avoid hypoglycemic episodes rather than tight control.   - Suboptimal hyperglycemia (170s to 250s) during hospitalization  - Discharge on PTA Tresiba and Novolog  - Follow up with PCP     Hyperlipidemia  Chronic.  - continue home atorvastatin     Hypothyroidism  Chronic.   - continue home levothyroxine     Iron Deficiency Anemia  Chronic. Hemoglobin 15.1 on admit. Stable.  - continue home iron    Greater than 35 minutes were spent in discharge planning.          Condition on Discharge:         Discharge vitals: Blood pressure (!) 166/77, pulse 63, temperature 97.5  F (36.4  C), temperature source Oral, resp. rate 16, height 1.524 m (5'), weight 80.4 kg (177 lb 4 oz), SpO2 94 %.         Gen: Well nourished, debilitated elderly female, alert and oriented x 3, no acute distressed  HEENT: Atraumatic, normocephalic  Lungs: Clear to ausculation without wheezes, rhonchi, or rales  Heart: Regular rate and rhythm, no gallops or rubs, no murmurs  GI: Bowel sound normal, no hepatosplenomegaly or masses  Lymph: No lymphadenopathy or edema  Skin: No rashes          Procedures / Labs / Imaging:   Most Recent 3 CBC's:  Recent Labs   Lab Test 10/08/19  0602 10/07/19  0526 10/06/19  0613   WBC 3.8* 4.9 5.8   HGB 13.4 13.2 13.3   MCV 95 95 96    229 198      Most Recent 3 BMP's:  Recent Labs   Lab Test 10/08/19  0602 10/07/19  0526 10/06/19  0613    139 137   POTASSIUM 3.6 3.8 3.9   CHLORIDE 104 103 103   CO2 29 28 27   BUN 10 13 17   CR 0.70 0.71 0.71   ANIONGAP 6 8 7   GRANT 8.6 8.4* 8.2*   * 184* 203*     Most Recent 3 Troponin's:  Recent Labs   Lab Test 11/24/16 2015 07/11/12  1509   TROPI 0.037 <0.012     Most Recent 3 INR's:No lab results found.  Most Recent 2  LFT's:  Recent Labs   Lab Test 10/03/19  1311 11/24/16 2015   AST 39 22   ALT 28 22   ALKPHOS 104 95   BILITOTAL 0.9 0.7     Most Recent Cholesterol Panel:No lab results found.  Most Recent 6 Bacteria Isolates From Any Culture (See EPIC Reports for Culture Details):  Recent Labs   Lab Test 10/03/19  1343 10/03/19  1330 10/03/19  1312 11/24/16 2015 11/24/16  1930 11/24/16  1915   CULT No growth after 5 days No growth after 5 days >100,000 colonies/mL  Escherichia coli  * No growth after 6 days No growth after 6 days >100,000 colonies/mL Escherichia coli*     Most Recent TSH, T4 and HgbA1c:   Recent Labs   Lab Test 10/03/19  1311   A1C 7.7*     Results for orders placed or performed during the hospital encounter of 10/03/19   XR Chest 2 Views    Narrative    CHEST TWO VIEWS  10/3/2019 2:26 PM     HISTORY: Fever.    COMPARISON: None.      Impression    IMPRESSION: Mild infiltrate at the left lung base medially is  suspicious for pneumonia. The right lung is clear. No pleural  effusions. There is mild pulmonary venous congestion.    CRUZ FUNES MD   CT Head w/o Contrast    Narrative    CT SCAN OF THE HEAD WITHOUT CONTRAST   10/3/2019 2:45 PM     HISTORY: Fall; confusion.    TECHNIQUE: Axial images of the head and coronal reformations without  IV contrast material. Radiation dose for this scan was reduced using  automated exposure control, adjustment of the mA and/or kV according  to patient size, or iterative reconstruction technique.    COMPARISON: 5/10/2013    FINDINGS: There is no evidence of intracranial hemorrhage, mass, acute  infarct or anomaly. There is moderate generalized brain parenchymal  volume loss. There is moderate patchy low attenuation in the white  matter of the cerebral hemispheres consistent with sequelae of small  vessel ischemic disease. Mild prominence of the lateral ventricles  likely related to central white matter volume loss.    Mild scattered mucosal thickening in the ethmoid air  cells. Mastoid  and middle ear cavities are clear. The bony calvarium and bones of the  skull base appear intact.       Impression    IMPRESSION: No evidence of acute intracranial hemorrhage, mass, or  herniation.      ANDRADE ZAVALA MD   CT Head w/o Contrast    Narrative    EXAM: CT HEAD W/O CONTRAST  LOCATION: [facility]  DATE/TIME: 10/7/2019 3:12 AM    INDICATION: Decreased level of consciousness  COMPARISON: 10/3/2019  TECHNIQUE: Routine without IV contrast. Multiplanar reformats. Dose reduction techniques were used.    FINDINGS:  INTRACRANIAL CONTENTS: No intracranial hemorrhage, extraaxial collection, or mass effect.  No CT evidence of acute infarct. Moderate presumed chronic small vessel ischemic changes. Moderate atrophy. Normal ventricles and sulci.     VISUALIZED ORBITS/SINUSES/MASTOIDS: No intraorbital abnormality. No paranasal sinus mucosal disease. No middle ear or mastoid effusion.    BONES/SOFT TISSUES: No acute abnormality.      Impression    IMPRESSION:  1.  No hemorrhage, mass, or mass effect.             Medications Prior to Admission:     Medications Prior to Admission   Medication Sig Dispense Refill Last Dose     Atorvastatin Calcium (LIPITOR PO) Take 40 mg by mouth daily   Past Week at am     Calcium Carbonate (CALCIUM-CARB 600 PO) Take 600 mg by mouth daily   Past Week at am     Cholecalciferol (VITAMIN D3 PO) Take 1,000 Units by mouth daily   Past Week at am     ferrous sulfate 325 (65 FE) MG tablet Take 65 mg by mouth daily (with lunch)    Past Week at am     Glucose Blood (BLOOD GLUCOSE TEST STRIPS VI) Check blood glucose 1-4 times daily   Past Week at Unknown time     insulin aspart (NOVOLOG PEN) 100 UNIT/ML pen INJECT 1 UNIT PER 20 GM OF CARBS SUBCUTANEOUS 3 TIMES DAILY TDD=30 UNITS   Past Week at Unknown time     insulin degludec (TRESIBA) 100 UNIT/ML pen Inject 10 Units Subcutaneous every morning   Past Week at am     levothyroxine (SYNTHROID) 125 MCG tablet Take 125 mcg  by mouth daily On an empty stomach   Past Week at am     Multiple Vitamins-Minerals (OCUVITE PRESERVISION PO) Take 1 tablet by mouth daily   Past Week at am     Omega-3 Fatty Acids (OMEGA-3 FISH OIL PO) Take 1 g by mouth 2 times daily   Past Week at am     Acetaminophen (TYLENOL PO) Take 650 mg by mouth every 4 hours as needed for mild pain or fever   More than a month at Unknown time     [DISCONTINUED] LISINOPRIL PO Take 2.5 mg by mouth daily   Past Week at am             Discharge Medications:     Current Discharge Medication List      START taking these medications    Details   cefpodoxime (VANTIN) 200 MG tablet Take 1 tablet (200 mg) by mouth 2 times daily for 4 days  Qty: 8 tablet, Refills: 0    Associated Diagnoses: Acute cystitis without hematuria         CONTINUE these medications which have CHANGED    Details   lisinopril (PRINIVIL/ZESTRIL) 10 MG tablet Take 1 tablet (10 mg) by mouth daily  Qty: 30 tablet, Refills: 0    Associated Diagnoses: Essential hypertension         CONTINUE these medications which have NOT CHANGED    Details   Atorvastatin Calcium (LIPITOR PO) Take 40 mg by mouth daily      Calcium Carbonate (CALCIUM-CARB 600 PO) Take 600 mg by mouth daily      Cholecalciferol (VITAMIN D3 PO) Take 1,000 Units by mouth daily      ferrous sulfate 325 (65 FE) MG tablet Take 65 mg by mouth daily (with lunch)       Glucose Blood (BLOOD GLUCOSE TEST STRIPS VI) Check blood glucose 1-4 times daily      insulin aspart (NOVOLOG PEN) 100 UNIT/ML pen INJECT 1 UNIT PER 20 GM OF CARBS SUBCUTANEOUS 3 TIMES DAILY TDD=30 UNITS      insulin degludec (TRESIBA) 100 UNIT/ML pen Inject 10 Units Subcutaneous every morning      levothyroxine (SYNTHROID) 125 MCG tablet Take 125 mcg by mouth daily On an empty stomach      Multiple Vitamins-Minerals (OCUVITE PRESERVISION PO) Take 1 tablet by mouth daily      Omega-3 Fatty Acids (OMEGA-3 FISH OIL PO) Take 1 g by mouth 2 times daily      Acetaminophen (TYLENOL PO) Take 650 mg by  mouth every 4 hours as needed for mild pain or fever                  Pending Results:     Unresulted Labs Ordered in the Past 30 Days of this Admission     Date and Time Order Name Status Description    10/3/2019 1319 Blood culture Preliminary     10/3/2019 1319 Blood culture Preliminary

## 2019-10-08 NOTE — PLAN OF CARE
Patient confused and disoriented to time place and situation.  Reorientation unsuccessful.  Patient set bed alarm off 8-10 times this shift.  Does not use call light appropriately.  Gets up without calling.  Denies pain.  Blood glucose at 0200 =202.  All patient needs should be anticipated.  Ambulates to bathroom with walker and stand by assist.  Tolerates well.

## 2019-10-08 NOTE — DISCHARGE INSTRUCTIONS
Follow up appointment made:  Tuesday, 10/15/2019  [facility]  At 11:00 am  With Dr. Olvera

## 2019-10-08 NOTE — PROGRESS NOTES
Name: Ana Easley    MRN#: [id number]    Reason for Hospitalization: Septic encephalopathy [G93.41]  Urinary tract infection with hematuria, site unspecified [N39.0, R31.9]    Discharge Date: 10/8/2019    Patient / Family response to discharge plan: Initial plan to discharge home with home care.  Met with patient and friend, Elizabeth.  Elizabeth has concerns today related to Ana's ability to understand discharge instructions and safety at home, Elizabeth is also leaving for a few days and unable to be available for any help.  Spoke with patient's niece, Kay related to concerns.  She agreed, and would like to pursue TCU.  Kay is unable to care for patient, as she lives 2 hours away.  Patient is agreeable with TCU.  Patient is accepted at [facility] Phone (Main Phone:[phone] Admissions Phone:[phone] Fax: [phone]).  Elizabeth will transport patient when ready.      Other Providers (Care Coordinator, County Services, PCA services etc): No    CTS Hand Off Completed: Yes: completed-    PAS #: [id number]    RAUL Score: average    Future Appointments: No future appointments.    Discharge Disposition: home    Discharge Planner   Discharge Plans in progress: TCU  Barriers to discharge plan: none  Follow up plan: TCU for therapies       Entered by: Erica Betancur 10/08/2019 10:22 AM     PAS-RR    Per DHS regulation, CTS team completed and submitted PAS-RR to [place] [employer] on [employer]ging Direct Connect via the Senior LinkAge Line. CTS team advised SNF and they are aware a PAS-RR has been submitted.     CTS team reviewed with pt or health care agent that they may be contacted for a follow up appointment within 10 days of hospital discharge if SNF stay is <30 days. Contact information for [facility] LinkAge Line was also provided.     Pt or health care agent verbalized understanding.     PAS-RR # [id number]

## 2019-10-08 NOTE — PLAN OF CARE
"[facility] DISCHARGE NOTE    Patient given discharge instructions to look over. This nurse went over discharge instructions with patient. Patient had difficult time understanding meds, Vantin(new med and Lisinopril(dose change). Explained to patient that home care will set up meds for her. Patient refused to sign discharge papers as she stated, \"I do not understand it.\" Went through discharge paper work with patient's friend also but her friend also declined to sign as she will be out of town and did not feel comfortable signing discharge form. Updated Génesis, discharge planner. Patient discharged to transitional care unit at 1:30 PM via wheel chair. Accompanied by other: friend and staff. Discharge instructions reviewed with patient and caregiver, opportunity offered to ask questions. Prescriptions sent with patient to fill . All belongings sent with patient.    Loretta Soliman RN  "

## 2019-10-08 NOTE — PLAN OF CARE
"Discharge Planner OT   Patient plan for discharge: Home with home care and assist from neighbor, however per discussion with care management neighbor now does not believe that patient is at baseline cognitively today. States a change from yesterday.    Current status: Pt oriented to year. Needs increased time to state correct month. Unable to state place (this is change from yesterday). Pt with disorganized thought at times difficult to follow story/conversation. Perseverates on discharge papers RN was going over with patient. (\"They want me to just change all this stuff and they just expect me to do it.\") explained to patient importance of following discharge instructions, however pt having a difficult time processing information and is not able to verbalize understanding.   CGA using RW to ambulate to/from bathroom.     Barriers to return to prior living situation: cognition.     Recommendations for discharge: TCU vs Home with 24/7 supervision/assist. Would recommend assist with medication administration or automatic med dispenser      Rationale for recommendations: Unsafe at home alone       Entered by: Tonja Cox 10/08/2019 12:27 PM       "

## 2019-10-08 NOTE — PLAN OF CARE
Patient A & O but forgetful. Up with stand by assist and walker to the bathroom. Blood sugars were 255 and 218 this shift. Using bed and chair alarms.

## 2019-10-09 ENCOUNTER — NURSING HOME VISIT (OUTPATIENT)
Dept: GERIATRICS | Facility: CLINIC | Age: 79
End: 2019-10-09
Payer: MEDICARE

## 2019-10-09 VITALS
WEIGHT: 174 LBS | OXYGEN SATURATION: 97 % | TEMPERATURE: 97.4 F | BODY MASS INDEX: 33.98 KG/M2 | HEART RATE: 62 BPM | RESPIRATION RATE: 18 BRPM

## 2019-10-09 DIAGNOSIS — G93.41 ACUTE METABOLIC ENCEPHALOPATHY: Primary | ICD-10-CM

## 2019-10-09 DIAGNOSIS — I10 ESSENTIAL HYPERTENSION: Chronic | ICD-10-CM

## 2019-10-09 DIAGNOSIS — E10.649 TYPE 1 DIABETES MELLITUS WITH HYPOGLYCEMIA AND WITHOUT COMA (H): Chronic | ICD-10-CM

## 2019-10-09 DIAGNOSIS — E03.9 HYPOTHYROIDISM, UNSPECIFIED TYPE: ICD-10-CM

## 2019-10-09 DIAGNOSIS — E78.5 HYPERLIPIDEMIA, UNSPECIFIED HYPERLIPIDEMIA TYPE: Chronic | ICD-10-CM

## 2019-10-09 DIAGNOSIS — N39.0 SEPSIS DUE TO URINARY TRACT INFECTION (H): ICD-10-CM

## 2019-10-09 DIAGNOSIS — R91.8 PULMONARY INFILTRATES ON CXR: ICD-10-CM

## 2019-10-09 DIAGNOSIS — Z79.899 POLYPHARMACY: ICD-10-CM

## 2019-10-09 DIAGNOSIS — A41.9 SEPSIS DUE TO URINARY TRACT INFECTION (H): ICD-10-CM

## 2019-10-09 LAB
BACTERIA SPEC CULT: NO GROWTH
BACTERIA SPEC CULT: NO GROWTH
Lab: NORMAL
Lab: NORMAL
SPECIMEN SOURCE: NORMAL
SPECIMEN SOURCE: NORMAL

## 2019-10-09 PROCEDURE — 99310 SBSQ NF CARE HIGH MDM 45: CPT | Performed by: NURSE PRACTITIONER

## 2019-10-09 NOTE — PROGRESS NOTES
[facility]  PRIMARY CARE PROVIDER AND CLINIC:  Rosaura Pierre MD, None  Chief Complaint   Patient presents with     Hospital F/U   [place] Medical Record Number:  [id number]  Place of Service where encounter took place:  [facility]  ([facility]) [[id number]] Ana Easley  is a 79 year old  (1940), admitted to the above facility from  [facility]. Hospital stay 10/3/19 through 10/8/19..  Admitted to this facility for  rehab, medical management and nursing care. HPI: HPI information obtained from: facility chart records, facility staff, patient report, [facility] chart review and Care Everywhere [facility] chart review.     Brief Summary of Hospital Course: Patient admitted to [facility] on 10/3 w/ AMS and was found to have a septic UTI. Her stay was complicated w/ acute metabolic encephalopathy. CXR showed possible left base infiltrates. She was treated with IV ceftriaxone and transitioned to PO cefpodoxime upon discharge. Her PMH includes Type 1 Diabetes, HTN, HLD, Hypothyroidism, and LEX.     Updates since admission to transitional care unit: Patient presented to TCU on 10/8 s/p the above hospitalization. Today, she denies urgency, dysuria, frequency, flank pain, or chills. She denies CP, SOB, headache, dizziness, nausea, diarrhea, or pain. She does have some issue with constipation as she has a bowel movement every 3-4 days. States she has has a good appetite and she is sleeping well. She admits to falling frequently at home. She states she does not feel confused anymore. Chart review shows polypharmacy with possibly non-essential medications. Last lipid panel in 2018 was stable and iron studies in 2004.   BP Trends  10/09/2019 00:01 134/78 mmHg   10/08/2019 21:49 138/70 mmHg  10/08/2019 17:50 118/79 mmHg   10/08/2019 13:50 152/81 mmHg   12/05/2016 01:42 178/78 mmHg  12/04/2016 12:35 139/73 mmHg   12/04/2016 07:35 146/69 mmHg   BG Trends  10/09/2019 08:02 128  mg/dL  10/08/2019 20:50 187 mg/dL  10/08/2019 16:58 299 mg/dL    CODE STATUS/ADVANCE DIRECTIVES DISCUSSION: DNR/DNI.    Patient's living condition: lives with spouse  ALLERGIES: Nka [no known allergies]PAST MEDICAL HISTORY:  has a past medical history of Essential hypertension (1/30/2007), Hyperlipidemia (1/30/2007), Hypothyroidism (1/30/2007), Iron deficiency anemia (2/15/2011), Non-toxic multinodular goiter (4/3/2007), Osteoarthritis (2/2/2007), Renal insufficiency (11/24/2016), and Type 1 diabetes mellitus (H) (11/26/2002). PAST SURGICAL HISTORY:   has a past surgical history that includes TOTAL HIP ARTHROPLASTY; Cholecystectomy; Hysterectomy; and Colonoscopy (N/A, 1/23/2017). FAMILY HISTORY: family history includes Alcoholism in her father; Blood Disease in her mother and sister; Cancer in her sister and sister; Cerebrovascular Disease in her maternal grandfather, maternal grandmother, and mother; Diabetes Type 2  in her mother; Hypertension in her maternal grandmother and mother. SOCIAL HISTORY:   reports that she has never smoked. She has never used smokeless tobacco. She reports that she does not drink alcohol or use drugs.  Post Discharge Medication Reconciliation Status: discharge medications reconciled and changed, per note/orders (see AVS)  Medication Sig     Acetaminophen (TYLENOL PO) Take 650 mg by Q4 hours as needed for mild pain/fever     Atorvastatin Calcium (LIPITOR PO) Take 40 mg by mouth daily      (CALCIUM-CARB 600 PO) Take 600 mg by mouth daily     cefpodoxime (VANTIN) 200 MG tab Take 1 tablet (200 mg) by mouth 2 times daily for 4 days     Cholecalciferol (VITAMIN D3 PO) Take 1,000 Units by mouth daily     insulin aspart (NOVOLOG FLEXPEN) 100 UNIT/ML pen Inject Subcutaneous 3 times daily (with meals) Inject as per sliding scale: if 0 - 199 = 0; 200 - 249 = 2 units; 250 - 299 = 4 units; 300 - 349 = 6 units; 350 - 399 = 8 units; 400 - 450 = 10 units; 451 - 999 = 12 units     insulin degludec  (TRESIBA) 100 UNIT/ML pen Inject 10 Units Subcutaneous every morning     levothyroxine 125 MCG tab Take 125 mcg by mouth daily On an empty stomach     lisinopril 10 MG tablet Take 1 tablet (10 mg) by mouth daily     (OCUVITE PRESERVISION PO) Take 1 tablet by mouth daily     senna-docusate  8.6-50 MG tablet Take 1 tablet by mouth 2 times daily as needed for constipation     ROS: Limited secondary to cognitive impairment but today pt reports the above and 10 point ROS of systems including Constitutional, Eyes, Respiratory, Cardiovascular, Gastroenterology, Genitourinary, Integumentary, Musculoskeletal, Psychiatric were all negative except for pertinent positives noted in my HPI.    Vitals:  Pulse 62   Temp 97.4  F (36.3  C)   Resp 18   Wt 78.9 kg (174 lb)   SpO2 97%   BMI 33.98 kg/m    Exam:  GENERAL APPEARANCE: Alert, in no distress, cooperative.   ENT: Mouth/posterior oropharynx intact w/ moist mucous membranes, hearing acuity [place].  EYES: EOM, conjunctivae, lids, pupils and irises normal, PERRL2.   RESP: Respiratory effort good, no respiratory distress, Lung sounds diminished with scattered rhonchi. On RA.  CV: Auscultation of heart reveals S1, S2, rate and rhythm regular, no murmur, no rub or gallop, Edema 2+ BLE. Peripheral pulses are 2+.  ABDOMEN: Normal bowel sounds, soft, non-tender abdomen, and no masses palpated.  SKIN: Inspection/Palpation of skin and subcutaneous tissue baseline w/ fragility. Bruising noted throughout extremities.  NEURO: CN II-XII at patient's baseline, sensation baseline PPS.  PSYCH: Insight, judgement, and memory are possibly impaired, affect and mood are happy/engaged.    Lab/Diagnostic data:  Most Recent 3 CBC's:  Recent Labs   Lab Test 10/08/19  0602 10/07/19  0526 10/06/19  0613   WBC 3.8* 4.9 5.8   HGB 13.4 13.2 13.3   MCV 95 95 96    229 198   Most Recent 3 BMP's:  Recent Labs   Lab Test 10/08/19  0602 10/07/19  0526 10/06/19  0613    139 137   POTASSIUM 3.6 3.8  3.9   CHLORIDE 104 103 103   CO2 29 28 27   BUN 10 13 17   CR 0.70 0.71 0.71   ANIONGAP 6 8 7   GRANT 8.6 8.4* 8.2*   * 184* 203*   Most Recent Hemoglobin A1c:  Recent Labs   Lab Test 10/03/19  1311   A1C 7.7*           ASSESSMENT/PLAN:  Acute metabolic encephalopathy  Sepsis due to urinary tract infection (H)  Pulmonary infiltrates on CXR  Type 1 diabetes mellitus with hypoglycemia and without coma (H)  Hypothyroidism, unspecified type  Hyperlipidemia, unspecified hyperlipidemia type  Essential hypertension  Polypharmacy  Acute. Labile. Continue abx dosing through 10/12, maintain safety given confusion. Will trend blood work given acute illness and recent Lisinopril changes. Noting FeSO4 can constipate and recent iron studies are stable, will discontinue FeSO4. Will start Senna for tx of constipation. We will discontinue Catherine-3 as polypharmacy can contribute to encephalopathy and lipid panel stable (also on Lipitor). Will encourage use of IS given clinical presentation of lungs and hospital course. Will start tensoshapes for noted dependent edema. Follow-up w/in 1 week or as needed.    Orders written by provider at facility and transcribed by : Miguel Kitchen  1. BMP, CBC x1 on 10/14 Dx: Htn/anemia  2. Senna-S 1 tab PO BID PRN Dx: Constipation  3. Discontinue Omega-3 Fish Oil Capsules, discontinue FeSO4.   4. Incentive Spirometer, every 2 hours while awake Dx; Cough  5. Tenso shapes, Knee high BLE on in AM off in PM.   Recommending future TSH evaluation when patient is no longer acutely ill.     Total time spent with patient visit at the skilled nursing facility was 35 min including patient visit and review of past records. Greater than 50% of total time spent with counseling and coordinating care due to outside records and confused patient (poor historian).     Electronically signed by:  Dr. Thi Elias, APRN CNP DNP  & Rebecca Gipson, RN, DNP Student    This patient was seen and examined by a  licensed healthcare provider, alongside an NP student, and with the consent of the patient.

## 2019-10-09 NOTE — LETTER
10/9/2019        RE: Ana Easley  [address] [facility] [address]        [place] [facility]  PRIMARY CARE PROVIDER AND CLINIC:  Rosaura Pierre MD, None  Chief Complaint   Patient presents with     Hospital F/U   [place] Medical Record Number:  [id number]  Place of Service where encounter took place:  [facility]  ([facility]GS) [[id number]] Ana Easley  is a 79 year old  (1940), admitted to the above facility from  [facility]. Hospital stay 10/3/19 through 10/8/19..  Admitted to this facility for  rehab, medical management and nursing care. HPI: HPI information obtained from: facility chart records, facility staff, patient report, [facility] chart review and Care Everywhere [facility] chart review.     Brief Summary of Hospital Course: Patient admitted to [facility] on 10/3 w/ AMS and was found to have a septic UTI. Her stay was complicated w/ acute metabolic encephalopathy. CXR showed possible left base infiltrates. She was treated with IV ceftriaxone and transitioned to PO cefpodoxime upon discharge. Her PMH includes Type 1 Diabetes, HTN, HLD, Hypothyroidism, and LEX.     Updates since admission to transitional care unit: Patient presented to TCU on 10/8 s/p the above hospitalization. Today, she denies urgency, dysuria, frequency, flank pain, or chills. She denies CP, SOB, headache, dizziness, nausea, diarrhea, or pain. She does have some issue with constipation as she has a bowel movement every 3-4 days. States she has has a good appetite and she is sleeping well. She admits to falling frequently at home. She states she does not feel confused anymore. Chart review shows polypharmacy with possibly non-essential medications. Last lipid panel in 2018 was stable and iron studies in 2004.   BP Trends  10/09/2019 00:01 134/78 mmHg   10/08/2019 21:49 138/70 mmHg  10/08/2019 17:50 118/79 mmHg   10/08/2019 13:50 152/81 mmHg   12/05/2016 01:42 178/78  mmHg  12/04/2016 12:35 139/73 mmHg   12/04/2016 07:35 146/69 mmHg   BG Trends  10/09/2019 08:02 128 mg/dL  10/08/2019 20:50 187 mg/dL  10/08/2019 16:58 299 mg/dL    CODE STATUS/ADVANCE DIRECTIVES DISCUSSION: DNR/DNI.    Patient's living condition: lives with spouse  ALLERGIES: Nka [no known allergies]PAST MEDICAL HISTORY:  has a past medical history of Essential hypertension (1/30/2007), Hyperlipidemia (1/30/2007), Hypothyroidism (1/30/2007), Iron deficiency anemia (2/15/2011), Non-toxic multinodular goiter (4/3/2007), Osteoarthritis (2/2/2007), Renal insufficiency (11/24/2016), and Type 1 diabetes mellitus (H) (11/26/2002). PAST SURGICAL HISTORY:   has a past surgical history that includes TOTAL HIP ARTHROPLASTY; Cholecystectomy; Hysterectomy; and Colonoscopy (N/A, 1/23/2017). FAMILY HISTORY: family history includes Alcoholism in her father; Blood Disease in her mother and sister; Cancer in her sister and sister; Cerebrovascular Disease in her maternal grandfather, maternal grandmother, and mother; Diabetes Type 2  in her mother; Hypertension in her maternal grandmother and mother. SOCIAL HISTORY:   reports that she has never smoked. She has never used smokeless tobacco. She reports that she does not drink alcohol or use drugs.  Post Discharge Medication Reconciliation Status: discharge medications reconciled and changed, per note/orders (see AVS)  Medication Sig     Acetaminophen (TYLENOL PO) Take 650 mg by Q4 hours as needed for mild pain/fever     Atorvastatin Calcium (LIPITOR PO) Take 40 mg by mouth daily      (CALCIUM-CARB 600 PO) Take 600 mg by mouth daily     cefpodoxime (VANTIN) 200 MG tab Take 1 tablet (200 mg) by mouth 2 times daily for 4 days     Cholecalciferol (VITAMIN D3 PO) Take 1,000 Units by mouth daily     insulin aspart (NOVOLOG FLEXPEN) 100 UNIT/ML pen Inject Subcutaneous 3 times daily (with meals) Inject as per sliding scale: if 0 - 199 = 0; 200 - 249 = 2 units; 250 - 299 = 4 units; 300 - 349 =  6 units; 350 - 399 = 8 units; 400 - 450 = 10 units; 451 - 999 = 12 units     insulin degludec (TRESIBA) 100 UNIT/ML pen Inject 10 Units Subcutaneous every morning     levothyroxine 125 MCG tab Take 125 mcg by mouth daily On an empty stomach     lisinopril 10 MG tablet Take 1 tablet (10 mg) by mouth daily     (OCUVITE PRESERVISION PO) Take 1 tablet by mouth daily     senna-docusate  8.6-50 MG tablet Take 1 tablet by mouth 2 times daily as needed for constipation     ROS: Limited secondary to cognitive impairment but today pt reports the above and 10 point ROS of systems including Constitutional, Eyes, Respiratory, Cardiovascular, Gastroenterology, Genitourinary, Integumentary, Musculoskeletal, Psychiatric were all negative except for pertinent positives noted in my HPI.    Vitals:  Pulse 62   Temp 97.4  F (36.3  C)   Resp 18   Wt 78.9 kg (174 lb)   SpO2 97%   BMI 33.98 kg/m     Exam:  GENERAL APPEARANCE: Alert, in no distress, cooperative.   ENT: Mouth/posterior oropharynx intact w/ moist mucous membranes, hearing acuity [place].  EYES: EOM, conjunctivae, lids, pupils and irises normal, PERRL2.   RESP: Respiratory effort good, no respiratory distress, Lung sounds diminished with scattered rhonchi. On RA.  CV: Auscultation of heart reveals S1, S2, rate and rhythm regular, no murmur, no rub or gallop, Edema 2+ BLE. Peripheral pulses are 2+.  ABDOMEN: Normal bowel sounds, soft, non-tender abdomen, and no masses palpated.  SKIN: Inspection/Palpation of skin and subcutaneous tissue baseline w/ fragility. Bruising noted throughout extremities.  NEURO: CN II-XII at patient's baseline, sensation baseline PPS.  PSYCH: Insight, judgement, and memory are possibly impaired, affect and mood are happy/engaged.    Lab/Diagnostic data:  Most Recent 3 CBC's:  Recent Labs   Lab Test 10/08/19  0602 10/07/19  0526 10/06/19  0613   WBC 3.8* 4.9 5.8   HGB 13.4 13.2 13.3   MCV 95 95 96    229 198   Most Recent 3 BMP's:  Recent Labs    Lab Test 10/08/19  0602 10/07/19  0526 10/06/19  0613    139 137   POTASSIUM 3.6 3.8 3.9   CHLORIDE 104 103 103   CO2 29 28 27   BUN 10 13 17   CR 0.70 0.71 0.71   ANIONGAP 6 8 7   GRANT 8.6 8.4* 8.2*   * 184* 203*   Most Recent Hemoglobin A1c:  Recent Labs   Lab Test 10/03/19  1311   A1C 7.7*           ASSESSMENT/PLAN:  Acute metabolic encephalopathy  Sepsis due to urinary tract infection (H)  Pulmonary infiltrates on CXR  Type 1 diabetes mellitus with hypoglycemia and without coma (H)  Hypothyroidism, unspecified type  Hyperlipidemia, unspecified hyperlipidemia type  Essential hypertension  Polypharmacy  Acute. Labile. Continue abx dosing through 10/12, maintain safety given confusion. Will trend blood work given acute illness and recent Lisinopril changes. Noting FeSO4 can constipate and recent iron studies are stable, will discontinue FeSO4. Will start Senna for tx of constipation. We will discontinue [place]-3 as polypharmacy can contribute to encephalopathy and lipid panel stable (also on Lipitor). Will encourage use of IS given clinical presentation of lungs and hospital course. Will start tensoshapes for noted dependent edema. Follow-up w/in 1 week or as needed.    Orders written by provider at facility and transcribed by : Miguel Kitchen  1. BMP, CBC x1 on 10/14 Dx: Htn/anemia  2. Senna-S 1 tab PO BID PRN Dx: Constipation  3. Discontinue Omega-3 Fish Oil Capsules, discontinue FeSO4.   4. Incentive Spirometer, every 2 hours while awake Dx; Cough  5. Tenso shapes, Knee high BLE on in AM off in PM.   Recommending future TSH evaluation when patient is no longer acutely ill.     Total time spent with patient visit at the skilled nursing facility was 35 min including patient visit and review of past records. Greater than 50% of total time spent with counseling and coordinating care due to outside records and confused patient (poor historian).     Electronically signed by:  Dr. Ness  KIA Elias CNP, DNP  & Rebecca Gipson RN, DNP Student    This patient was seen and examined by a licensed healthcare provider, alongside an NP student, and with the consent of the patient.       Sincerely,        KIA Gould CNP

## 2019-10-10 RX ORDER — AMOXICILLIN 250 MG
1 CAPSULE ORAL 2 TIMES DAILY PRN
Start: 2019-10-10

## 2019-10-14 ENCOUNTER — TRANSFERRED RECORDS (OUTPATIENT)
Dept: HEALTH INFORMATION MANAGEMENT | Facility: CLINIC | Age: 79
End: 2019-10-14

## 2019-10-14 ENCOUNTER — RECORDS - HEALTHEAST (OUTPATIENT)
Dept: LAB | Facility: CLINIC | Age: 79
End: 2019-10-14

## 2019-10-14 LAB
ANION GAP SERPL CALCULATED.3IONS-SCNC: 6 MMOL/L (ref 5–18)
ANION GAP SERPL CALCULATED.3IONS-SCNC: 6 MMOL/L (ref 5–18)
BUN SERPL-MCNC: 14 MG/DL (ref 8–28)
BUN SERPL-MCNC: 14 MG/DL (ref 8–28)
CALCIUM SERPL-MCNC: 8.8 MG/DL (ref 8.5–10.5)
CALCIUM SERPL-MCNC: 8.8 MG/DL (ref 8.5–10.5)
CHLORIDE BLD-SCNC: 101 MMOL/L (ref 98–107)
CHLORIDE SERPLBLD-SCNC: 101 MMOL/L (ref 98–107)
CO2 SERPL-SCNC: 31 MMOL/L (ref 22–31)
CO2 SERPL-SCNC: 31 MMOL/L (ref 22–31)
CREAT SERPL-MCNC: 0.88 MG/DL (ref 0.6–1.1)
CREAT SERPL-MCNC: 0.88 MG/DL (ref 0.6–1.1)
ERYTHROCYTE [DISTWIDTH] IN BLOOD BY AUTOMATED COUNT: 13.7 % (ref 11–14.5)
ERYTHROCYTE [DISTWIDTH] IN BLOOD BY AUTOMATED COUNT: 13.7 % (ref 11–14.5)
GFR SERPL CREATININE-BSD FRML MDRD: >60 ML/MIN/1.73M2
GFR SERPL CREATININE-BSD FRML MDRD: >60 ML/MIN/1.73M2
GLUCOSE BLD-MCNC: 318 MG/DL (ref 70–125)
GLUCOSE SERPL-MCNC: 318 MG/DL (ref 70–125)
HCT VFR BLD AUTO: 42.4 % (ref 35–47)
HCT VFR BLD AUTO: 42.4 % (ref 35–47)
HEMOGLOBIN: 13.6 G/DL (ref 12–16)
HGB BLD-MCNC: 13.6 G/DL (ref 12–16)
IRON SERPL-MCNC: 50 UG/DL (ref 42–175)
MCH RBC QN AUTO: 31.5 PG (ref 27–34)
MCH RBC QN AUTO: 31.5 PG (ref 27–34)
MCHC RBC AUTO-ENTMCNC: 32.1 G/DL (ref 32–36)
MCHC RBC AUTO-ENTMCNC: 32.1 G/DL (ref 32–36)
MCV RBC AUTO: 98 FL (ref 80–100)
MCV RBC AUTO: 98 FL (ref 80–100)
PLATELET # BLD AUTO: 328 THOU/UL (ref 140–440)
PLATELET # BLD AUTO: 328 THOU/UL (ref 140–440)
PMV BLD AUTO: 10.2 FL (ref 8.5–12.5)
POTASSIUM BLD-SCNC: 4 MMOL/L (ref 3.5–5)
POTASSIUM SERPL-SCNC: 4 MMOL/L (ref 3.5–5)
RBC # BLD AUTO: 4.32 MILL/UL (ref 3.8–5.4)
RBC # BLD AUTO: 4.32 MILL/UL (ref 3.8–5.4)
SODIUM SERPL-SCNC: 138 MMOL/L (ref 136–145)
SODIUM SERPL-SCNC: 138 MMOL/L (ref 136–145)
WBC # BLD AUTO: 4.6 THOU/UL (ref 4–11)
WBC: 4.6 THOU/UL (ref 4–11)

## 2019-10-15 ENCOUNTER — NURSING HOME VISIT (OUTPATIENT)
Dept: GERIATRICS | Facility: CLINIC | Age: 79
End: 2019-10-15
Payer: MEDICARE

## 2019-10-15 VITALS
HEART RATE: 62 BPM | RESPIRATION RATE: 18 BRPM | WEIGHT: 174 LBS | OXYGEN SATURATION: 95 % | SYSTOLIC BLOOD PRESSURE: 148 MMHG | DIASTOLIC BLOOD PRESSURE: 68 MMHG | BODY MASS INDEX: 33.98 KG/M2 | TEMPERATURE: 97.5 F

## 2019-10-15 DIAGNOSIS — Z79.899 POLYPHARMACY: ICD-10-CM

## 2019-10-15 DIAGNOSIS — E10.649 TYPE 1 DIABETES MELLITUS WITH HYPOGLYCEMIA AND WITHOUT COMA (H): ICD-10-CM

## 2019-10-15 DIAGNOSIS — I10 ESSENTIAL HYPERTENSION: ICD-10-CM

## 2019-10-15 DIAGNOSIS — A41.9 SEPSIS DUE TO URINARY TRACT INFECTION (H): ICD-10-CM

## 2019-10-15 DIAGNOSIS — G93.41 ACUTE METABOLIC ENCEPHALOPATHY: Primary | ICD-10-CM

## 2019-10-15 DIAGNOSIS — N39.0 SEPSIS DUE TO URINARY TRACT INFECTION (H): ICD-10-CM

## 2019-10-15 PROCEDURE — 99309 SBSQ NF CARE MODERATE MDM 30: CPT | Performed by: NURSE PRACTITIONER

## 2019-10-15 NOTE — PROGRESS NOTES
[place] [facility]  [place] Medical Record Number:  [id number]  Place of Service where encounter took place:  [facility]  (S) [[id number]]  Chief Complaint   Patient presents with     Nursing Home Acute   HPI: Ana Easley  is a 79 year old (1940), who is being seen today for an episodic care visit.  HPI information obtained from: facility chart records, facility staff, patient report, [facility] chart review and Care Everywhere [facility] chart review. Today's concern is:    Acute metabolic encephalopathy  Sepsis due to urinary tract infection (H)  Type 1 diabetes mellitus with hypoglycemia and without coma (H)  Essential hypertension  Polypharmacy  Last visit, on admission to TCU, we discontinued Omega-3 and FeSO4 d/t polyphramacy and known labs. We rechecked blood work which is grossly WDL and is noted below. We started Senna for constipation, and IS to prevent pneumonia. Today, patient is happy, confused, and is not certain what she is supposed to be doing. We note a BIMS score of 10/15, showing moderate cognitive impairment present and therapy is conducting further testing. We note abx finished on 10/13 and patient denies dysuria, cough, fever, SOB, CP, palpitations, headache. Chart review shows stable VS. BG trend noted below:  10/15/2019 20:04 223.0 mg/dL   10/15/2019 15:28 360.0 mg/dL   10/15/2019 12:05 332.0 mg/dL   10/15/2019 08:04 130.0 mg/dL   10/14/2019 20:09 225.0 mg/dL   10/14/2019 16:35 254.0 mg/dL   10/14/2019 12:25 257.0 mg/dL   10/14/2019 08:06 336.0 mg/dL    Past Medical and Surgical History reviewed in [facility] today.  REVIEW OF SYSTEMS: Limited secondary to cognitive impairment but today pt reports the above and 4 point ROS including Respiratory, CV, GI and , other than that noted in the HPI, is negative.    Objective:  BP (!) 148/68   Pulse 62   Temp 97.5  F (36.4  C)   Resp 18   Wt 78.9 kg (174 lb)   SpO2 95%   BMI 33.98 kg/m    Exam:  GENERAL APPEARANCE:  Alert, in no distress, cooperative.   ENT: Mouth/posterior oropharynx intact w/ moist mucous membranes, hearing acuity [place].  EYES: EOM, conjunctivae, lids, pupils and irises normal, PERRL2.   RESP: Respiratory effort good, no respiratory distress, Lung sounds diminished with scattered rhonchi. On RA.  CV: Auscultation of heart reveals S1, S2, rate and rhythm regular, no murmur, no rub or gallop, Edema 2+ BLE. Peripheral pulses are 2+.  ABDOMEN: Normal bowel sounds, soft, non-tender abdomen, and no masses palpated.  SKIN: Inspection/Palpation of skin and subcutaneous tissue baseline w/ fragility. Bruising noted throughout extremities.  NEURO: CN II-XII at patient's baseline, sensation baseline PPS.  PSYCH: Insight, judgement, and memory are possibly impaired, affect and mood are happy/engaged.    Labs:       ASSESSMENT/PLAN:  Acute metabolic encephalopathy  Sepsis due to urinary tract infection (H)  Type 1 diabetes mellitus with hypoglycemia and without coma (H)  Essential hypertension  Polypharmacy  Chronic. Stable. Patient tolerated changes from last week and encephalopathy has likely resolved. Suspect dementia underlying remains. BG fair in DM type-I, blood work stable. Follow-up routinely or as needed.    Orders written by provider at facility and transcribed by : Miguel Kitchen  1. No new orders at this time    Electronically signed by:  Dr. Thi Elias, APRN CNP DNP

## 2019-10-15 NOTE — LETTER
10/15/2019        RE: Ana Easley  [address] [facility] [address]        [place] [facility]  [place] Medical Record Number:  [id number]  Place of Service where encounter took place:  [facility]  (S) [[id number]]  Chief Complaint   Patient presents with     Nursing Home Acute   HPI: Ana Easley  is a 79 year old (1940), who is being seen today for an episodic care visit.  HPI information obtained from: facility chart records, facility staff, patient report, [facility] chart review and Care Everywhere [facility] chart review. Today's concern is:    Acute metabolic encephalopathy  Sepsis due to urinary tract infection (H)  Type 1 diabetes mellitus with hypoglycemia and without coma (H)  Essential hypertension  Polypharmacy  Last visit, on admission to TCU, we discontinued Omega-3 and FeSO4 d/t polyphramacy and known labs. We rechecked blood work which is grossly WDL and is noted below. We started Senna for constipation, and IS to prevent pneumonia. Today, patient is happy, confused, and is not certain what she is supposed to be doing. We note a BIMS score of 10/15, showing moderate cognitive impairment present and therapy is conducting further testing. We note abx finished on 10/13 and patient denies dysuria, cough, fever, SOB, CP, palpitations, headache. Chart review shows stable VS. BG trend noted below:  10/15/2019 20:04 223.0 mg/dL   10/15/2019 15:28 360.0 mg/dL   10/15/2019 12:05 332.0 mg/dL   10/15/2019 08:04 130.0 mg/dL   10/14/2019 20:09 225.0 mg/dL   10/14/2019 16:35 254.0 mg/dL   10/14/2019 12:25 257.0 mg/dL   10/14/2019 08:06 336.0 mg/dL    Past Medical and Surgical History reviewed in [facility] today.  REVIEW OF SYSTEMS: Limited secondary to cognitive impairment but today pt reports the above and 4 point ROS including Respiratory, CV, GI and , other than that noted in the HPI, is negative.    Objective:  BP (!) 148/68   Pulse 62   Temp 97.5   F (36.4  C)   Resp 18   Wt 78.9 kg (174 lb)   SpO2 95%   BMI 33.98 kg/m     Exam:  GENERAL APPEARANCE: Alert, in no distress, cooperative.   ENT: Mouth/posterior oropharynx intact w/ moist mucous membranes, hearing acuity [place].  EYES: EOM, conjunctivae, lids, pupils and irises normal, PERRL2.   RESP: Respiratory effort good, no respiratory distress, Lung sounds diminished with scattered rhonchi. On RA.  CV: Auscultation of heart reveals S1, S2, rate and rhythm regular, no murmur, no rub or gallop, Edema 2+ BLE. Peripheral pulses are 2+.  ABDOMEN: Normal bowel sounds, soft, non-tender abdomen, and no masses palpated.  SKIN: Inspection/Palpation of skin and subcutaneous tissue baseline w/ fragility. Bruising noted throughout extremities.  NEURO: CN II-XII at patient's baseline, sensation baseline PPS.  PSYCH: Insight, judgement, and memory are possibly impaired, affect and mood are happy/engaged.    Labs:       ASSESSMENT/PLAN:  Acute metabolic encephalopathy  Sepsis due to urinary tract infection (H)  Type 1 diabetes mellitus with hypoglycemia and without coma (H)  Essential hypertension  Polypharmacy  Chronic. Stable. Patient tolerated changes from last week and encephalopathy has likely resolved. Suspect dementia underlying remains. BG fair in DM type-I, blood work stable. Follow-up routinely or as needed.    Orders written by provider at facility and transcribed by : Miguel Kitchen  1. No new orders at this time    Electronically signed by:  Dr. Thi Elias, KIA CNP [facility]        Sincerely,        KIA Gould CNP

## 2019-10-20 VITALS
RESPIRATION RATE: 20 BRPM | OXYGEN SATURATION: 93 % | TEMPERATURE: 98.1 F | BODY MASS INDEX: 33.98 KG/M2 | HEART RATE: 79 BPM | SYSTOLIC BLOOD PRESSURE: 101 MMHG | WEIGHT: 174 LBS | DIASTOLIC BLOOD PRESSURE: 63 MMHG

## 2019-10-20 NOTE — PROGRESS NOTES
[place] [facility]  PRIMARY CARE PROVIDER AND CLINIC:  Rosaura Pierre MD, None  Chief Complaint   Patient presents with     Hospital F/U     [place] Medical Record Number:  [id number]  Place of Service where encounter took place:  [facility]  (FGS) [[id number]]    Ana Easley  is a 79 year old  (1940), admitted to the above facility from  [facility]. Hospital stay 10/3/19 through 10/8/19.  Admitted to this facility for  rehab, medical management and nursing care.    HPI:    HPI information obtained from: facility staff, patient report, [facility] chart review and DNP.   Brief Summary of Hospital Course:   - Pt with PMH notable for dementia, diabetes, recently hospitalized on 10/3 for encephalopathy 2/2 sepsis 2/2 UTI, query LLL pna treated with abx.     Updates on Status Since Skilled nursing Admission:   - Pt seen and examined, reports started rehab program and making a progress, can walk using 4ww independently.   - denies chest pain, SOB, cough, wheezing, sob, fever, chills, dysuria, urinary urgency or frequency.     ==================================  CODE STATUS/ADVANCE DIRECTIVES DISCUSSION:   DNR / DNI  Patient's living condition: lives with spouse  ALLERGIES: Nka [no known allergies]  PAST MEDICAL HISTORY:  has a past medical history of Essential hypertension (1/30/2007), Hyperlipidemia (1/30/2007), Hypothyroidism (1/30/2007), Iron deficiency anemia (2/15/2011), Non-toxic multinodular goiter (4/3/2007), Osteoarthritis (2/2/2007), Renal insufficiency (11/24/2016), and Type 1 diabetes mellitus (H) (11/26/2002).  PAST SURGICAL HISTORY:   has a past surgical history that includes TOTAL HIP ARTHROPLASTY; Cholecystectomy; Hysterectomy; and Colonoscopy (N/A, 1/23/2017).  FAMILY HISTORY: family history includes Alcoholism in her father; Blood Disease in her mother and sister; Cancer in her sister and sister; Cerebrovascular Disease in her maternal grandfather,  maternal grandmother, and mother; Diabetes Type 2  in her mother; Hypertension in her maternal grandmother and mother.  SOCIAL HISTORY:   reports that she has never smoked. She has never used smokeless tobacco. She reports that she does not drink alcohol or use drugs.    Post Discharge Medication Reconciliation Status: discharge medications reconciled and changed, per note/orders (see AVS)  Current Outpatient Medications   Medication Sig Dispense Refill     Acetaminophen (TYLENOL PO) Take 650 mg by mouth every 4 hours as needed for mild pain or fever       Atorvastatin Calcium (LIPITOR PO) Take 40 mg by mouth daily       Calcium Carbonate (CALCIUM-CARB 600 PO) Take 600 mg by mouth daily       Cholecalciferol (VITAMIN D3 PO) Take 1,000 Units by mouth daily       Glucose Blood (BLOOD GLUCOSE TEST STRIPS VI) Check blood glucose 1-4 times daily       insulin aspart (NOVOLOG FLEXPEN) 100 UNIT/ML pen Inject Subcutaneous 3 times daily (with meals) Inject as per sliding scale: if 0 - 199 = 0; 200 - 249 = 2 units; 250 - 299 = 4 units; 300 - 349 = 6 units; 350 - 399 = 8 units; 400 - 450 = 10 units; 451 - 999 = 12 units       insulin degludec (TRESIBA) 100 UNIT/ML pen Inject 10 Units Subcutaneous every morning       levothyroxine (SYNTHROID) 125 MCG tablet Take 125 mcg by mouth daily On an empty stomach       lisinopril (PRINIVIL/ZESTRIL) 10 MG tablet Take 1 tablet (10 mg) by mouth daily 30 tablet 0     Multiple Vitamins-Minerals (OCUVITE PRESERVISION PO) Take 1 tablet by mouth daily       senna-docusate (SENOKOT-S/PERICOLACE) 8.6-50 MG tablet Take 1 tablet by mouth 2 times daily as needed for constipation       ROS:  10 point ROS of systems including Constitutional, Eyes, Respiratory, Cardiovascular, Gastroenterology, Genitourinary, Integumentary, Musculoskeletal, Psychiatric were all negative except for pertinent positives noted in my HPI.    Vitals:  /63   Pulse 79   Temp 98.1  F (36.7  C)   Resp 20   Wt 78.9 kg  (174 lb)   SpO2 93%   BMI 33.98 kg/m    Exam:     GENERAL APPEARANCE:  in no distress, cooperative  ENT:  Mouth and posterior oropharynx normal, moist mucous membranes, oral mucosa moist, no lesion noted.   EYES:  EOMI, Pupil rounded and equal.  RESP:  lungs clear to auscultation, diminished at the bases, no wheezing or crackles.   CV:  S1S2 audible, regular HR, no murmur appreciated.   ABDOMEN:  soft, NT/ND, BS audible. no mass appreciated on palpation.   M/S:   no joint deformity noted on observation.   SKIN:  No rash.   NEURO:   No NFD appreciated on observation. Hand  5/5 b/l  PSYCH:  AAOx3, normal insight, judgement and memory, affect and mood normal      Lab/Diagnostic data: Reviewed in the chart and EHR.        ASSESSMENT/PLAN:  Acute metabolic encephalopathy  Sepsis due to urinary tract infection (H)  Query LLL PNA  - resolved. Completed ABx    Type 1 diabetes mellitus with hypoglycemia and without coma (H:  - HbA1C 7.7%. continue insulin.     Hyperlipidemia, unspecified hyperlipidemia type: on lipitor.   Essential hypertension:-  Controlled.     Physical Deconditioning:  - started rehab program, making a progress, continue until desired goal is achieved.     Order: See above, otherwise, continue the rest of the current POC.       Electronically signed by:  Ivania Garcia MD

## 2019-10-21 ENCOUNTER — NURSING HOME VISIT (OUTPATIENT)
Dept: GERIATRICS | Facility: CLINIC | Age: 79
End: 2019-10-21
Payer: MEDICARE

## 2019-10-21 DIAGNOSIS — G93.41 ACUTE METABOLIC ENCEPHALOPATHY: Primary | ICD-10-CM

## 2019-10-21 DIAGNOSIS — R53.81 PHYSICAL DECONDITIONING: ICD-10-CM

## 2019-10-21 DIAGNOSIS — J18.9 PNEUMONIA OF BOTH LOWER LOBES DUE TO INFECTIOUS ORGANISM: ICD-10-CM

## 2019-10-21 DIAGNOSIS — E78.5 HYPERLIPIDEMIA, UNSPECIFIED HYPERLIPIDEMIA TYPE: ICD-10-CM

## 2019-10-21 DIAGNOSIS — N39.0 SEPSIS DUE TO URINARY TRACT INFECTION (H): ICD-10-CM

## 2019-10-21 DIAGNOSIS — A41.9 SEPSIS DUE TO URINARY TRACT INFECTION (H): ICD-10-CM

## 2019-10-21 DIAGNOSIS — E10.649 TYPE 1 DIABETES MELLITUS WITH HYPOGLYCEMIA AND WITHOUT COMA (H): ICD-10-CM

## 2019-10-21 DIAGNOSIS — I10 ESSENTIAL HYPERTENSION: ICD-10-CM

## 2019-10-21 PROCEDURE — 99305 1ST NF CARE MODERATE MDM 35: CPT | Performed by: FAMILY MEDICINE

## 2019-10-21 NOTE — LETTER
10/21/2019        RE: Ana Easley  [address] [address]        [place] [facility]  PRIMARY CARE PROVIDER AND CLINIC:  Rosaura Pierre MD, None  Chief Complaint   Patient presents with     Hospital F/U     [place] Medical Record Number:  [id number]  Place of Service where encounter took place:  [facility]  ([facility]) [[id number]]    Ana Easley  is a 79 year old  (1940), admitted to the above facility from  [facility]. Hospital stay 10/3/19 through 10/8/19.  Admitted to this facility for  rehab, medical management and nursing care.    HPI:    HPI information obtained from: facility staff, patient report, [facility] chart review and DNP.   Brief Summary of Hospital Course:   - Pt with PMH notable for dementia, diabetes, recently hospitalized on 10/3 for encephalopathy 2/2 sepsis 2/2 UTI, query LLL pna treated with abx.     Updates on Status Since Skilled nursing Admission:   - Pt seen and examined, reports started rehab program and making a progress, can walk using 4ww independently.   - denies chest pain, SOB, cough, wheezing, sob, fever, chills, dysuria, urinary urgency or frequency.     ==================================  CODE STATUS/ADVANCE DIRECTIVES DISCUSSION:   DNR / DNI  Patient's living condition: lives with spouse  ALLERGIES: Nka [no known allergies]  PAST MEDICAL HISTORY:  has a past medical history of Essential hypertension (1/30/2007), Hyperlipidemia (1/30/2007), Hypothyroidism (1/30/2007), Iron deficiency anemia (2/15/2011), Non-toxic multinodular goiter (4/3/2007), Osteoarthritis (2/2/2007), Renal insufficiency (11/24/2016), and Type 1 diabetes mellitus (H) (11/26/2002).  PAST SURGICAL HISTORY:   has a past surgical history that includes TOTAL HIP ARTHROPLASTY; Cholecystectomy; Hysterectomy; and Colonoscopy (N/A, 1/23/2017).  FAMILY HISTORY: family history includes Alcoholism in her father; Blood Disease in  her mother and sister; Cancer in her sister and sister; Cerebrovascular Disease in her maternal grandfather, maternal grandmother, and mother; Diabetes Type 2  in her mother; Hypertension in her maternal grandmother and mother.  SOCIAL HISTORY:   reports that she has never smoked. She has never used smokeless tobacco. She reports that she does not drink alcohol or use drugs.    Post Discharge Medication Reconciliation Status: discharge medications reconciled and changed, per note/orders (see AVS)  Current Outpatient Medications   Medication Sig Dispense Refill     Acetaminophen (TYLENOL PO) Take 650 mg by mouth every 4 hours as needed for mild pain or fever       Atorvastatin Calcium (LIPITOR PO) Take 40 mg by mouth daily       Calcium Carbonate (CALCIUM-CARB 600 PO) Take 600 mg by mouth daily       Cholecalciferol (VITAMIN D3 PO) Take 1,000 Units by mouth daily       Glucose Blood (BLOOD GLUCOSE TEST STRIPS VI) Check blood glucose 1-4 times daily       insulin aspart (NOVOLOG FLEXPEN) 100 UNIT/ML pen Inject Subcutaneous 3 times daily (with meals) Inject as per sliding scale: if 0 - 199 = 0; 200 - 249 = 2 units; 250 - 299 = 4 units; 300 - 349 = 6 units; 350 - 399 = 8 units; 400 - 450 = 10 units; 451 - 999 = 12 units       insulin degludec (TRESIBA) 100 UNIT/ML pen Inject 10 Units Subcutaneous every morning       levothyroxine (SYNTHROID) 125 MCG tablet Take 125 mcg by mouth daily On an empty stomach       lisinopril (PRINIVIL/ZESTRIL) 10 MG tablet Take 1 tablet (10 mg) by mouth daily 30 tablet 0     Multiple Vitamins-Minerals (OCUVITE PRESERVISION PO) Take 1 tablet by mouth daily       senna-docusate (SENOKOT-S/PERICOLACE) 8.6-50 MG tablet Take 1 tablet by mouth 2 times daily as needed for constipation       ROS:  10 point ROS of systems including Constitutional, Eyes, Respiratory, Cardiovascular, Gastroenterology, Genitourinary, Integumentary, Musculoskeletal, Psychiatric were all negative except for pertinent  positives noted in my HPI.    Vitals:  /63   Pulse 79   Temp 98.1  F (36.7  C)   Resp 20   Wt 78.9 kg (174 lb)   SpO2 93%   BMI 33.98 kg/m     Exam:     GENERAL APPEARANCE:  in no distress, cooperative  ENT:  Mouth and posterior oropharynx normal, moist mucous membranes, oral mucosa moist, no lesion noted.   EYES:  EOMI, Pupil rounded and equal.  RESP:  lungs clear to auscultation, diminished at the bases, no wheezing or crackles.   CV:  S1S2 audible, regular HR, no murmur appreciated.   ABDOMEN:  soft, NT/ND, BS audible. no mass appreciated on palpation.   M/S:   no joint deformity noted on observation.   SKIN:  No rash.   NEURO:   No NFD appreciated on observation. Hand  5/5 b/l  PSYCH:  AAOx3, normal insight, judgement and memory, affect and mood normal      Lab/Diagnostic data: Reviewed in the chart and EHR.        ASSESSMENT/PLAN:  Acute metabolic encephalopathy  Sepsis due to urinary tract infection (H)  Query LLL PNA  - resolved. Completed ABx    Type 1 diabetes mellitus with hypoglycemia and without coma (H:  - HbA1C 7.7%. continue insulin.     Hyperlipidemia, unspecified hyperlipidemia type: on lipitor.   Essential hypertension:-  Controlled.     Physical Deconditioning:  - started rehab program, making a progress, continue until desired goal is achieved.     Order: See above, otherwise, continue the rest of the current POC.       Electronically signed by:  Ivania Garcia MD         Sincerely,        Ivania Garcia MD

## 2019-10-23 ENCOUNTER — DISCHARGE SUMMARY NURSING HOME (OUTPATIENT)
Dept: GERIATRICS | Facility: CLINIC | Age: 79
End: 2019-10-23
Payer: MEDICARE

## 2019-10-23 VITALS
BODY MASS INDEX: 33.28 KG/M2 | RESPIRATION RATE: 16 BRPM | HEART RATE: 64 BPM | TEMPERATURE: 97.7 F | SYSTOLIC BLOOD PRESSURE: 128 MMHG | DIASTOLIC BLOOD PRESSURE: 76 MMHG | WEIGHT: 170.4 LBS | OXYGEN SATURATION: 97 %

## 2019-10-23 DIAGNOSIS — N39.0 SEPSIS DUE TO URINARY TRACT INFECTION (H): ICD-10-CM

## 2019-10-23 DIAGNOSIS — A41.9 SEPSIS DUE TO URINARY TRACT INFECTION (H): ICD-10-CM

## 2019-10-23 DIAGNOSIS — G93.41 ACUTE METABOLIC ENCEPHALOPATHY: Primary | ICD-10-CM

## 2019-10-23 DIAGNOSIS — I10 ESSENTIAL HYPERTENSION: ICD-10-CM

## 2019-10-23 DIAGNOSIS — Z79.899 POLYPHARMACY: ICD-10-CM

## 2019-10-23 DIAGNOSIS — E10.649 TYPE 1 DIABETES MELLITUS WITH HYPOGLYCEMIA AND WITHOUT COMA (H): ICD-10-CM

## 2019-10-23 PROCEDURE — 99316 NF DSCHRG MGMT 30 MIN+: CPT | Performed by: NURSE PRACTITIONER

## 2019-10-23 NOTE — LETTER
10/23/2019        RE: Ana Easley  [address] [facility] [address] [facility] [address]        [place] [facility] DISCHARGE SUMMARY  PATIENT'S NAME: Ana Easley  YOB: 1940  MEDICAL RECORD NUMBER:  [id number]  Place of Service where encounter took place:  [facility]  ([facility]) [[id number]]  PRIMARY CARE PROVIDER AND CLINIC RESPONSIBLE AFTER TRANSFER:   Rosaura Pierre MD.   Non-FMG Provider   Transferring providers: KIA Mahoney CNP, DNP & Ivania Garcia MD.  Recent Hospitalization/ED:  [facility] stay 10/3 to 10/8.  Date of TCU Admission: October / 08 / 2019  Date of TCU (anticipated) Discharge: October / 24 / 2019  Discharged to: previous independent home and with family and hired care.  Cognitive Scores: BIMS: 10/15, SLUMS: 15/30 and CPT: 4.1/5.6 She is NOT appropriate to be home alone or for med management.  Physical Function: Tinetti Balance Assessment: 21/28.  DME: None.    CODE STATUS/ADVANCE DIRECTIVES DISCUSSION:  DNR/DNI.  ALLERGIES: Nka [no known allergies]    DISCHARGE DIAGNOSIS/NURSING FACILITY COURSE:   Acute metabolic encephalopathy  Sepsis due to urinary tract infection (H)  Patient presented to TCU on 10/8 s/p hospitalization for sepsis secondary to UTI. It was also questionable that she may have pneumonia. She was treated w/ abx and her mentation improved. She denies fever/chills, dysuria and does not recall even having a UTI. Chart review shows VSS and cognition as noted above.    Type 1 diabetes mellitus with hypoglycemia and without coma (H)  Essential hypertension  Polypharmacy  These chronic problems were controlled during TCU stay and noting some hyperglycemia secondary to infection. We note her use of many OTC products, and discontinued Omega-3 and FeSO4 while she was here. Hgb and other labs have been stable as noted below. We are concerned about her ability to manage her Type-I DM at home, given her  cognition inability to self-manage medications. We recommend Endocrinology alter her regimen for ease of care ASAP. She will need much assistance to regulate safely at home. BG trend while here is noted below:  10/24/2019 08:12 233.0 mg/dL   10/23/2019 20:25 227.0 mg/dL   10/23/2019 16:04 187.0 mg/dL   10/23/2019 12:45 325.0 mg/dL   10/23/2019 07:38 218.0 mg/dL   10/22/2019 20:04 328.0 mg/dL   10/22/2019 15:36 246.0 mg/dL   10/22/2019 11:50 254.0 mg/dL   10/22/2019 08:07 131.0 mg/dL   10/21/2019 19:19 242.0 mg/dL   10/21/2019 15:59 217.0 mg/dL   10/21/2019 11:25 311.0 mg/dL   10/21/2019 08:48 213.0 mg/dL     Past Medical History:  has a past medical history of Essential hypertension (1/30/2007), Hyperlipidemia (1/30/2007), Hypothyroidism (1/30/2007), Iron deficiency anemia (2/15/2011), Non-toxic multinodular goiter (4/3/2007), Osteoarthritis (2/2/2007), Renal insufficiency (11/24/2016), and Type 1 diabetes mellitus (H) (11/26/2002).  Discharge Medications:  Medication Sig     Acetaminophen (TYLENOL PO) Take 650 mg by mouth every 4 hours as needed for mild pain or fever     Atorvastatin Calcium (LIPITOR PO) Take 40 mg by mouth daily     Calcium Carbonate (CALCIUM-CARB 600 PO) Take 600 mg by mouth daily     Cholecalciferol (VITAMIN D3 PO) Take 1,000 Units by mouth daily     Glucose Blood (BLOOD GLUCOSE TEST STRIPS VI) Check blood glucose 1-4 times daily     insulin aspart (NOVOLOG FLEXPEN) 100 UNIT/ML pen Inject Subcutaneous 3 times daily (with meals) Inject as per sliding scale: if 0 - 199 = 0; 200 - 249 = 2 units; 250 - 299 = 4 units; 300 - 349 = 6 units; 350 - 399 = 8 units; 400 - 450 = 10 units; 451 - 999 = 12 units     insulin degludec (TRESIBA) 100 UNIT/ML pen Inject 10 Units Subcutaneous every morning     levothyroxine (SYNTHROID) 125 MCG tablet Take 125 mcg by mouth daily On an empty stomach     lisinopril (PRINIVIL/ZESTRIL) 10 MG tablet Take 1 tablet (10 mg) by mouth daily     Multiple Vitamins-Minerals  (OCUVITE PRESERVISION PO) Take 1 tablet by mouth daily     senna-docusate (SENOKOT-S/PERICOLACE) 8.6-50 MG tablet Take 1 tablet by mouth 2 times daily as needed for constipation     Medication Changes/Rationale:     Discontinued Omega-3.    Discontinue FeSO4 (resolved).    Started Tenoshape compression.    Controlled medications sent with patient:   not applicable/none     ROS: Limited secondary to cognitive impairment but today pt reports the above and 6 point ROS including Respiratory, CV, GI and , other than that noted in the HPI,  is negative    Physical Exam: Vitals: /76   Pulse 64   Temp 97.7  F (36.5  C)   Resp 16   Wt 77.3 kg (170 lb 6.4 oz)   SpO2 97%   BMI 33.28 kg/m   . BMI= Body mass index is 33.28 kg/m .  GENERAL APPEARANCE: Alert, in no distress, cooperative.   ENT: Mouth/posterior oropharynx intact w/ moist mucous membranes, hearing acuity [place].  EYES: EOM, conjunctivae, lids, pupils and irises normal, PERRL2.   RESP: Respiratory effort good, no respiratory distress, Lung sounds clear. On RA.   CV: Auscultation of heart reveals S1, S2, rate and rhythm regular, no murmur, no rub or gallop, Edema 1+ BLE. Peripheral pulses are 2+.  ABDOMEN: Normal bowel sounds, soft, non-tender abdomen, and no masses palpated.  SKIN: Inspection/Palpation of skin and subcutaneous tissue baseline w/ fragility. No wounds/rashes noted.   NEURO: CN II-XII at patient's baseline, sensation baseline PPS.  PSYCH: Insight, judgement, and memory are impaired, affect and mood are happy/confused.    Facility Labs:       DISCHARGE PLAN:    Follow up labs: No labs orders/due.    Medical Follow Up: Follow up with primary care provider in 1 week.    MTM referral needed and placed by this provider: No    Current [place] scheduled appointments: None.    Discharge Services: Home Care:  Occupational Therapy, Physical Therapy, Speech Therapy , Registered Nurse,  and From:  [facility]    Discharge  Instructions Verbalized to Patient at Discharge:     Driving is not recommended.     Discharge Orders:  1. No new orders at this time.    TOTAL DISCHARGE TIME: Greater than 30 minutes     Electronically signed by:  KIA Moraes CNP DNP  ______________________________________  Documentation of Face to Face and Certification for Home Health Services    I certify that patient: Ana Easley is under my care and that I, had a face-to-face encounter that meets the provider face-to-face encounter requirements with this patient on: 10/23/2019.    This encounter with the patient was in whole, or in part, for the following medical condition, which is the primary reason for home health care: Cognitive impairment.    I certify that, based on my findings, the following services are medically necessary home health services: Nursing, Occupational Therapy, Physical Therapy, Speech Language Therapy and Social Work.    My clinical findings support the need for the above services because: Nurse is needed: To assess adherence and safety after changes in medications or other medical regimen.., Occupational Therapy Services are needed to assess and treat cognitive ability and address ADL safety due to impairment in memory., Physical Therapy Services are needed to assess and treat the following functional impairments: mobility.. and Speech Therapy Services are needed to assess and treat impairments in language and/or swallow functions due to mobility.    Further, I certify that my clinical findings support that this patient is homebound (i.e. absences from home require considerable and taxing effort and are for medical reasons or [employer] services or infrequently or of short duration when for other reasons) because: Requires assistance of another person or specialized equipment to access medical services because patient: Is prone to wander/get lost without assistance...    Based on the above findings. I certify that  this patient is confined to the home and needs intermittent skilled nursing care, physical therapy and/or speech therapy.  The patient is under my care, and I have initiated the establishment of the plan of care.  This patient will be followed by a provider who will periodically review the plan of care.    Provider to give follow up care: Rosaura Pierre    Attending Medicare certified PECOS provider: KIA Moraes CNP, DNP  Provider Signature: See electronic signature associated with these discharge orders.  Date: 10/23/2019          Sincerely,        KIA Gould CNP

## 2019-10-23 NOTE — PROGRESS NOTES
[place] [facility] DISCHARGE SUMMARY  PATIENT'S NAME: Ana Easley  YOB: 1940  MEDICAL RECORD NUMBER:  [id number]  Place of Service where encounter took place:  [facility]  ([facility]) [[id number]]  PRIMARY CARE PROVIDER AND CLINIC RESPONSIBLE AFTER TRANSFER:   Rosaura Pierre MD.   Non-FMG Provider   Transferring providers: KIA Mahoney CNP, DNP & Ivania Garcia MD.  Recent Hospitalization/ED:  [facility] stay 10/3 to 10/8.  Date of TCU Admission: October / 08 / 2019  Date of TCU (anticipated) Discharge: October / 24 / 2019  Discharged to: previous independent home and with family and hired care.  Cognitive Scores: BIMS: 10/15, SLUMS: 15/30 and CPT: 4.1/5.6 She is NOT appropriate to be home alone or for med management.  Physical Function: Tinetti Balance Assessment: 21/28.  DME: None.    CODE STATUS/ADVANCE DIRECTIVES DISCUSSION:  DNR/DNI.  ALLERGIES: Nka [no known allergies]    DISCHARGE DIAGNOSIS/NURSING FACILITY COURSE:   Acute metabolic encephalopathy  Sepsis due to urinary tract infection (H)  Patient presented to TCU on 10/8 s/p hospitalization for sepsis secondary to UTI. It was also questionable that she may have pneumonia. She was treated w/ abx and her mentation improved. She denies fever/chills, dysuria and does not recall even having a UTI. Chart review shows VSS and cognition as noted above.    Type 1 diabetes mellitus with hypoglycemia and without coma (H)  Essential hypertension  Polypharmacy  These chronic problems were controlled during TCU stay and noting some hyperglycemia secondary to infection. We note her use of many OTC products, and discontinued Omega-3 and FeSO4 while she was here. Hgb and other labs have been stable as noted below. We are concerned about her ability to manage her Type-I DM at home, given her cognition inability to self-manage medications. We recommend Endocrinology alter her regimen for ease of  care ASAP. She will need much assistance to regulate safely at home. BG trend while here is noted below:  10/24/2019 08:12 233.0 mg/dL   10/23/2019 20:25 227.0 mg/dL   10/23/2019 16:04 187.0 mg/dL   10/23/2019 12:45 325.0 mg/dL   10/23/2019 07:38 218.0 mg/dL   10/22/2019 20:04 328.0 mg/dL   10/22/2019 15:36 246.0 mg/dL   10/22/2019 11:50 254.0 mg/dL   10/22/2019 08:07 131.0 mg/dL   10/21/2019 19:19 242.0 mg/dL   10/21/2019 15:59 217.0 mg/dL   10/21/2019 11:25 311.0 mg/dL   10/21/2019 08:48 213.0 mg/dL     Past Medical History:  has a past medical history of Essential hypertension (1/30/2007), Hyperlipidemia (1/30/2007), Hypothyroidism (1/30/2007), Iron deficiency anemia (2/15/2011), Non-toxic multinodular goiter (4/3/2007), Osteoarthritis (2/2/2007), Renal insufficiency (11/24/2016), and Type 1 diabetes mellitus (H) (11/26/2002).  Discharge Medications:  Medication Sig     Acetaminophen (TYLENOL PO) Take 650 mg by mouth every 4 hours as needed for mild pain or fever     Atorvastatin Calcium (LIPITOR PO) Take 40 mg by mouth daily     Calcium Carbonate (CALCIUM-CARB 600 PO) Take 600 mg by mouth daily     Cholecalciferol (VITAMIN D3 PO) Take 1,000 Units by mouth daily     Glucose Blood (BLOOD GLUCOSE TEST STRIPS VI) Check blood glucose 1-4 times daily     insulin aspart (NOVOLOG FLEXPEN) 100 UNIT/ML pen Inject Subcutaneous 3 times daily (with meals) Inject as per sliding scale: if 0 - 199 = 0; 200 - 249 = 2 units; 250 - 299 = 4 units; 300 - 349 = 6 units; 350 - 399 = 8 units; 400 - 450 = 10 units; 451 - 999 = 12 units     insulin degludec (TRESIBA) 100 UNIT/ML pen Inject 10 Units Subcutaneous every morning     levothyroxine (SYNTHROID) 125 MCG tablet Take 125 mcg by mouth daily On an empty stomach     lisinopril (PRINIVIL/ZESTRIL) 10 MG tablet Take 1 tablet (10 mg) by mouth daily     Multiple Vitamins-Minerals (OCUVITE PRESERVISION PO) Take 1 tablet by mouth daily     senna-docusate (SENOKOT-S/PERICOLACE) 8.6-50 MG  tablet Take 1 tablet by mouth 2 times daily as needed for constipation     Medication Changes/Rationale:     Discontinued Omega-3.    Discontinue FeSO4 (resolved).    Started Tenoshape compression.    Controlled medications sent with patient:   not applicable/none     ROS: Limited secondary to cognitive impairment but today pt reports the above and 6 point ROS including Respiratory, CV, GI and , other than that noted in the HPI,  is negative    Physical Exam: Vitals: /76   Pulse 64   Temp 97.7  F (36.5  C)   Resp 16   Wt 77.3 kg (170 lb 6.4 oz)   SpO2 97%   BMI 33.28 kg/m  . BMI= Body mass index is 33.28 kg/m .  GENERAL APPEARANCE: Alert, in no distress, cooperative.   ENT: Mouth/posterior oropharynx intact w/ moist mucous membranes, hearing acuity [place].  EYES: EOM, conjunctivae, lids, pupils and irises normal, PERRL2.   RESP: Respiratory effort good, no respiratory distress, Lung sounds clear. On RA.   CV: Auscultation of heart reveals S1, S2, rate and rhythm regular, no murmur, no rub or gallop, Edema 1+ BLE. Peripheral pulses are 2+.  ABDOMEN: Normal bowel sounds, soft, non-tender abdomen, and no masses palpated.  SKIN: Inspection/Palpation of skin and subcutaneous tissue baseline w/ fragility. No wounds/rashes noted.   NEURO: CN II-XII at patient's baseline, sensation baseline PPS.  PSYCH: Insight, judgement, and memory are impaired, affect and mood are happy/confused.    Facility Labs:       DISCHARGE PLAN:    Follow up labs: No labs orders/due.    Medical Follow Up: Follow up with primary care provider in 1 week.    MTM referral needed and placed by this provider: No    Current [place] scheduled appointments: None.    Discharge Services: Home Care:  Occupational Therapy, Physical Therapy, Speech Therapy , Registered Nurse,  and From:  [facility]    Discharge Instructions Verbalized to Patient at Discharge:     Driving is not recommended.     Discharge Orders:  1. No new  orders at this time.    TOTAL DISCHARGE TIME: Greater than 30 minutes     Electronically signed by:  KIA Moraes CNP DNP  ______________________________________  Documentation of Face to Face and Certification for Home Health Services    I certify that patient: Ana Easley is under my care and that I, had a face-to-face encounter that meets the provider face-to-face encounter requirements with this patient on: 10/23/2019.    This encounter with the patient was in whole, or in part, for the following medical condition, which is the primary reason for home health care: Cognitive impairment.    I certify that, based on my findings, the following services are medically necessary home health services: Nursing, Occupational Therapy, Physical Therapy, Speech Language Therapy and Social Work.    My clinical findings support the need for the above services because: Nurse is needed: To assess adherence and safety after changes in medications or other medical regimen.., Occupational Therapy Services are needed to assess and treat cognitive ability and address ADL safety due to impairment in memory., Physical Therapy Services are needed to assess and treat the following functional impairments: mobility.. and Speech Therapy Services are needed to assess and treat impairments in language and/or swallow functions due to mobility.    Further, I certify that my clinical findings support that this patient is homebound (i.e. absences from home require considerable and taxing effort and are for medical reasons or [employer] services or infrequently or of short duration when for other reasons) because: Requires assistance of another person or specialized equipment to access medical services because patient: Is prone to wander/get lost without assistance...    Based on the above findings. I certify that this patient is confined to the home and needs intermittent skilled nursing care, physical therapy and/or speech  therapy.  The patient is under my care, and I have initiated the establishment of the plan of care.  This patient will be followed by a provider who will periodically review the plan of care.    Provider to give follow up care: Rosaura Pierre    Attending Medicare certified PECOS provider: KIA Moraes CNP DNP  Provider Signature: See electronic signature associated with these discharge orders.  Date: 10/23/2019

## 2019-10-25 ENCOUNTER — NURSING HOME VISIT (OUTPATIENT)
Dept: GERIATRICS | Facility: CLINIC | Age: 79
End: 2019-10-25
Payer: MEDICARE

## 2019-10-25 DIAGNOSIS — G93.41 ACUTE METABOLIC ENCEPHALOPATHY: Primary | ICD-10-CM

## 2019-10-25 DIAGNOSIS — N39.0 SEPSIS DUE TO URINARY TRACT INFECTION (H): ICD-10-CM

## 2019-10-25 DIAGNOSIS — E10.649 TYPE 1 DIABETES MELLITUS WITH HYPOGLYCEMIA AND WITHOUT COMA (H): ICD-10-CM

## 2019-10-25 DIAGNOSIS — Z79.899 POLYPHARMACY: ICD-10-CM

## 2019-10-25 DIAGNOSIS — I10 ESSENTIAL HYPERTENSION: ICD-10-CM

## 2019-10-25 DIAGNOSIS — A41.9 SEPSIS DUE TO URINARY TRACT INFECTION (H): ICD-10-CM

## 2019-10-25 PROCEDURE — 99309 SBSQ NF CARE MODERATE MDM 30: CPT | Performed by: NURSE PRACTITIONER

## 2019-10-25 NOTE — LETTER
"    10/25/2019        RE: Ana Easley  [address] [facility] [address]        [facility]  [place] Medical Record Number:  [id number]  Place of Service where encounter took place:  [facility]  ([facility]) [[id number]]  Chief Complaint   Patient presents with     Nursing Home Acute   HPI: Ana Easley  is a 79 year old (1940), who is being seen today for an episodic care visit.  HPI information obtained from: facility chart records, facility staff, patient report, [facility] chart review and Care Everywhere [facility] chart review. Today's concern is:    Acute metabolic encephalopathy  Sepsis due to urinary tract infection (H)  Type 1 diabetes mellitus with hypoglycemia and without coma (H)  Essential hypertension  Polypharmacy  Patient was scheduled for discharge on 10/24 and family at bedside today. We noted her severe cognitive impairment and how her assessments had indicated that she was not safe to be alone or administer medications. Family at bedside today and questioning discharge process and planning.    Niece Kay, sister Sophia, and neighbor/friend Elizabeth at bedside today asking about continuing therapy to keep Shobha safe before going home. Provider educated regarding Shobha's memory deficits and the judgment needed to dose her insulin based on her BG readings. Shobha stated that she has a bad memory, she doesn't remember what her BG readings were this morning, and she isn't sure how much insulin she would need. Therapy states she has some skills w/ insulin administration and testing (as she has been doing this her entire life), but would not know how to trouble-shoot or problem solve.     Provider initiated discussions about next steps. We could attempt to continue therapies, but we think she has plateau'd. SW at bedside and explaining coverage and private pay options. Provider asked Shobha \"how long\" she was considering staying at her house, and Shobha " "stated, she could sell the house and \"maybe now is the time to do that.\" Family in agreement and are starting to tour ALFs. Family is further away in [place] and wants to find an [facility] close to them.     Past Medical and Surgical History reviewed in [facility] today.  REVIEW OF SYSTEMS: Limited secondary to cognitive impairment but today pt reports the above and 4 point ROS including Respiratory, CV, GI and , other than that noted in the HPI, is negative.    Objective:  /71   Pulse 76   Temp 98.1  F (36.7  C)   Resp 16   Wt 78 kg (172 lb)   SpO2 97%   BMI 33.59 kg/m     Exam:  GENERAL APPEARANCE: Alert, in no distress, cooperative.   ENT: Mouth/posterior oropharynx intact w/ moist mucous membranes, hearing acuity [place].  EYES: EOM, conjunctivae, lids, pupils and irises normal, PERRL2.   SKIN: Inspection/Palpation of skin and subcutaneous tissue baseline w/ fragility. No wounds/rashes noted.   NEURO: CN II-XII at patient's baseline, sensation baseline PPS.  PSYCH: Insight, judgement, and memory are impaired, affect and mood are happy.    Labs:   Recent labs in [facility] reviewed by me today.     ASSESSMENT/PLAN:  Acute metabolic encephalopathy  Sepsis due to urinary tract infection (H)  Type 1 diabetes mellitus with hypoglycemia and without coma (H)  Essential hypertension  Polypharmacy  Acute-on-chronic. To maintain safety, we recommend supervised care in [facility] or [facility], or for 24hr nursing. Family toured and selected an [facility], and patient will be moving there within the next week or two. They will likely run out of \"therapy covered days\" in the interim, and they have elected to private pay for a few days in order to make the transition smooth. SW collaborating w/ family. Provider answered health questions.     Orders written by provider at facility and transcribed by : Miguel Kitchen  No new orders. Will sign transfer orders for [facility] when available.     Electronically signed by:  KIA Moraes CNP " DNP        Sincerely,        KIA Gould CNP

## 2019-10-29 VITALS
RESPIRATION RATE: 16 BRPM | BODY MASS INDEX: 33.59 KG/M2 | OXYGEN SATURATION: 97 % | WEIGHT: 172 LBS | SYSTOLIC BLOOD PRESSURE: 120 MMHG | DIASTOLIC BLOOD PRESSURE: 71 MMHG | TEMPERATURE: 98.1 F | HEART RATE: 76 BPM

## 2019-10-29 NOTE — PROGRESS NOTES
"[facility]  [place] Medical Record Number:  [id number]  Place of Service where encounter took place:  [facility]  ([facility]) [[id number]]  Chief Complaint   Patient presents with     Nursing Home Acute   HPI: Ana Easley  is a 79 year old (1940), who is being seen today for an episodic care visit.  HPI information obtained from: facility chart records, facility staff, patient report, [facility] chart review and Care Everywhere [facility] chart review. Today's concern is:    Acute metabolic encephalopathy  Sepsis due to urinary tract infection (H)  Type 1 diabetes mellitus with hypoglycemia and without coma (H)  Essential hypertension  Polypharmacy  Patient was scheduled for discharge on 10/24 and family at bedside today. We noted her severe cognitive impairment and how her assessments had indicated that she was not safe to be alone or administer medications. Family at bedside today and questioning discharge process and planning.    Niece Kay, sister Sophia, and neighbor/friend Elizabeth at bedside today asking about continuing therapy to keep Shobha safe before going home. Provider educated regarding Shobha's memory deficits and the judgment needed to dose her insulin based on her BG readings. Shobha stated that she has a bad memory, she doesn't remember what her BG readings were this morning, and she isn't sure how much insulin she would need. Therapy states she has some skills w/ insulin administration and testing (as she has been doing this her entire life), but would not know how to trouble-shoot or problem solve.     Provider initiated discussions about next steps. We could attempt to continue therapies, but we think she has plateau'd. SW at bedside and explaining coverage and private pay options. Provider asked Shobha \"how long\" she was considering staying at her house, and Shobha stated, she could sell the house and \"maybe now is the time to do that.\" Family in agreement and are " "starting to tour ALFs. Family is further away in [place] and wants to find an [facility] close to them.     Past Medical and Surgical History reviewed in [facility] today.  REVIEW OF SYSTEMS: Limited secondary to cognitive impairment but today pt reports the above and 4 point ROS including Respiratory, CV, GI and , other than that noted in the HPI, is negative.    Objective:  /71   Pulse 76   Temp 98.1  F (36.7  C)   Resp 16   Wt 78 kg (172 lb)   SpO2 97%   BMI 33.59 kg/m    Exam:  GENERAL APPEARANCE: Alert, in no distress, cooperative.   ENT: Mouth/posterior oropharynx intact w/ moist mucous membranes, hearing acuity [place].  EYES: EOM, conjunctivae, lids, pupils and irises normal, PERRL2.   SKIN: Inspection/Palpation of skin and subcutaneous tissue baseline w/ fragility. No wounds/rashes noted.   NEURO: CN II-XII at patient's baseline, sensation baseline PPS.  PSYCH: Insight, judgement, and memory are impaired, affect and mood are happy.    Labs:   Recent labs in [facility] reviewed by me today.     ASSESSMENT/PLAN:  Acute metabolic encephalopathy  Sepsis due to urinary tract infection (H)  Type 1 diabetes mellitus with hypoglycemia and without coma (H)  Essential hypertension  Polypharmacy  Acute-on-chronic. To maintain safety, we recommend supervised care in [facility] or [facility], or for 24hr nursing. Family toured and selected an [facility], and patient will be moving there within the next week or two. They will likely run out of \"therapy covered days\" in the interim, and they have elected to private pay for a few days in order to make the transition smooth. SW collaborating w/ family. Provider answered health questions.     Orders written by provider at facility and transcribed by : Miguel Kitchen  No new orders. Will sign transfer orders for [facility] when available.     Electronically signed by:  Dr. Thi Elias, APRN CNP DNP    "

## 2020-04-16 RX ORDER — LEVOTHYROXINE SODIUM 125 UG/1
TABLET ORAL
Refills: 23 | OUTPATIENT
Start: 2020-04-16

## 2021-05-27 ENCOUNTER — RECORDS - HEALTHEAST (OUTPATIENT)
Dept: ADMINISTRATIVE | Facility: CLINIC | Age: 81
End: 2021-05-27

## 2021-05-29 ENCOUNTER — HEALTH MAINTENANCE LETTER (OUTPATIENT)
Age: 81
End: 2021-05-29

## 2021-09-18 ENCOUNTER — HEALTH MAINTENANCE LETTER (OUTPATIENT)
Age: 81
End: 2021-09-18

## 2022-01-08 ENCOUNTER — HEALTH MAINTENANCE LETTER (OUTPATIENT)
Age: 82
End: 2022-01-08

## 2022-06-19 ENCOUNTER — HEALTH MAINTENANCE LETTER (OUTPATIENT)
Age: 82
End: 2022-06-19

## 2022-08-14 ENCOUNTER — HEALTH MAINTENANCE LETTER (OUTPATIENT)
Age: 82
End: 2022-08-14

## 2022-11-19 ENCOUNTER — HEALTH MAINTENANCE LETTER (OUTPATIENT)
Age: 82
End: 2022-11-19

## 2023-04-09 ENCOUNTER — HEALTH MAINTENANCE LETTER (OUTPATIENT)
Age: 83
End: 2023-04-09

## 2023-07-02 ENCOUNTER — HEALTH MAINTENANCE LETTER (OUTPATIENT)
Age: 83
End: 2023-07-02

## 2023-11-19 ENCOUNTER — HEALTH MAINTENANCE LETTER (OUTPATIENT)
Age: 83
End: 2023-11-19